# Patient Record
Sex: FEMALE | Race: OTHER | NOT HISPANIC OR LATINO | URBAN - METROPOLITAN AREA
[De-identification: names, ages, dates, MRNs, and addresses within clinical notes are randomized per-mention and may not be internally consistent; named-entity substitution may affect disease eponyms.]

---

## 2022-01-15 ENCOUNTER — EMERGENCY (EMERGENCY)
Facility: HOSPITAL | Age: 22
LOS: 0 days | Discharge: HOME | End: 2022-01-16
Attending: STUDENT IN AN ORGANIZED HEALTH CARE EDUCATION/TRAINING PROGRAM | Admitting: STUDENT IN AN ORGANIZED HEALTH CARE EDUCATION/TRAINING PROGRAM
Payer: MEDICAID

## 2022-01-15 VITALS
SYSTOLIC BLOOD PRESSURE: 105 MMHG | TEMPERATURE: 98 F | OXYGEN SATURATION: 98 % | RESPIRATION RATE: 18 BRPM | HEART RATE: 79 BPM | DIASTOLIC BLOOD PRESSURE: 68 MMHG

## 2022-01-15 DIAGNOSIS — R10.2 PELVIC AND PERINEAL PAIN: ICD-10-CM

## 2022-01-15 DIAGNOSIS — N80.1 ENDOMETRIOSIS OF OVARY: ICD-10-CM

## 2022-01-15 DIAGNOSIS — N39.0 URINARY TRACT INFECTION, SITE NOT SPECIFIED: ICD-10-CM

## 2022-01-15 LAB
ALBUMIN SERPL ELPH-MCNC: 4.9 G/DL — SIGNIFICANT CHANGE UP (ref 3.5–5.2)
ALP SERPL-CCNC: 81 U/L — SIGNIFICANT CHANGE UP (ref 30–115)
ALT FLD-CCNC: 16 U/L — SIGNIFICANT CHANGE UP (ref 0–41)
ANION GAP SERPL CALC-SCNC: 14 MMOL/L — SIGNIFICANT CHANGE UP (ref 7–14)
APPEARANCE UR: ABNORMAL
AST SERPL-CCNC: 15 U/L — SIGNIFICANT CHANGE UP (ref 0–41)
BACTERIA # UR AUTO: ABNORMAL
BASOPHILS # BLD AUTO: 0.04 K/UL — SIGNIFICANT CHANGE UP (ref 0–0.2)
BASOPHILS NFR BLD AUTO: 0.5 % — SIGNIFICANT CHANGE UP (ref 0–1)
BILIRUB SERPL-MCNC: 0.4 MG/DL — SIGNIFICANT CHANGE UP (ref 0.2–1.2)
BILIRUB UR-MCNC: NEGATIVE — SIGNIFICANT CHANGE UP
BUN SERPL-MCNC: 8 MG/DL — LOW (ref 10–20)
CALCIUM SERPL-MCNC: 9.5 MG/DL — SIGNIFICANT CHANGE UP (ref 8.5–10.1)
CHLORIDE SERPL-SCNC: 95 MMOL/L — LOW (ref 98–110)
CO2 SERPL-SCNC: 22 MMOL/L — SIGNIFICANT CHANGE UP (ref 17–32)
COLOR SPEC: YELLOW — SIGNIFICANT CHANGE UP
CREAT SERPL-MCNC: 0.6 MG/DL — LOW (ref 0.7–1.5)
DIFF PNL FLD: NEGATIVE — SIGNIFICANT CHANGE UP
EOSINOPHIL # BLD AUTO: 0.07 K/UL — SIGNIFICANT CHANGE UP (ref 0–0.7)
EOSINOPHIL NFR BLD AUTO: 0.9 % — SIGNIFICANT CHANGE UP (ref 0–8)
EPI CELLS # UR: 9 /HPF — HIGH (ref 0–5)
GLUCOSE SERPL-MCNC: 118 MG/DL — HIGH (ref 70–99)
GLUCOSE UR QL: NEGATIVE — SIGNIFICANT CHANGE UP
HCT VFR BLD CALC: 38.6 % — SIGNIFICANT CHANGE UP (ref 37–47)
HGB BLD-MCNC: 12.7 G/DL — SIGNIFICANT CHANGE UP (ref 12–16)
HYALINE CASTS # UR AUTO: 13 /LPF — HIGH (ref 0–7)
IMM GRANULOCYTES NFR BLD AUTO: 0.1 % — SIGNIFICANT CHANGE UP (ref 0.1–0.3)
KETONES UR-MCNC: NEGATIVE — SIGNIFICANT CHANGE UP
LEUKOCYTE ESTERASE UR-ACNC: ABNORMAL
LYMPHOCYTES # BLD AUTO: 1.66 K/UL — SIGNIFICANT CHANGE UP (ref 1.2–3.4)
LYMPHOCYTES # BLD AUTO: 21.5 % — SIGNIFICANT CHANGE UP (ref 20.5–51.1)
MCHC RBC-ENTMCNC: 28.5 PG — SIGNIFICANT CHANGE UP (ref 27–31)
MCHC RBC-ENTMCNC: 32.9 G/DL — SIGNIFICANT CHANGE UP (ref 32–37)
MCV RBC AUTO: 86.7 FL — SIGNIFICANT CHANGE UP (ref 81–99)
MONOCYTES # BLD AUTO: 0.5 K/UL — SIGNIFICANT CHANGE UP (ref 0.1–0.6)
MONOCYTES NFR BLD AUTO: 6.5 % — SIGNIFICANT CHANGE UP (ref 1.7–9.3)
NEUTROPHILS # BLD AUTO: 5.43 K/UL — SIGNIFICANT CHANGE UP (ref 1.4–6.5)
NEUTROPHILS NFR BLD AUTO: 70.5 % — SIGNIFICANT CHANGE UP (ref 42.2–75.2)
NITRITE UR-MCNC: NEGATIVE — SIGNIFICANT CHANGE UP
NRBC # BLD: 0 /100 WBCS — SIGNIFICANT CHANGE UP (ref 0–0)
PH UR: 6 — SIGNIFICANT CHANGE UP (ref 5–8)
PLATELET # BLD AUTO: 283 K/UL — SIGNIFICANT CHANGE UP (ref 130–400)
POTASSIUM SERPL-MCNC: 3.7 MMOL/L — SIGNIFICANT CHANGE UP (ref 3.5–5)
POTASSIUM SERPL-SCNC: 3.7 MMOL/L — SIGNIFICANT CHANGE UP (ref 3.5–5)
PROT SERPL-MCNC: 7.8 G/DL — SIGNIFICANT CHANGE UP (ref 6–8)
PROT UR-MCNC: SIGNIFICANT CHANGE UP
RBC # BLD: 4.45 M/UL — SIGNIFICANT CHANGE UP (ref 4.2–5.4)
RBC # FLD: 12 % — SIGNIFICANT CHANGE UP (ref 11.5–14.5)
RBC CASTS # UR COMP ASSIST: 15 /HPF — HIGH (ref 0–4)
SODIUM SERPL-SCNC: 131 MMOL/L — LOW (ref 135–146)
SP GR SPEC: 1.02 — SIGNIFICANT CHANGE UP (ref 1.01–1.03)
UROBILINOGEN FLD QL: SIGNIFICANT CHANGE UP
WBC # BLD: 7.71 K/UL — SIGNIFICANT CHANGE UP (ref 4.8–10.8)
WBC # FLD AUTO: 7.71 K/UL — SIGNIFICANT CHANGE UP (ref 4.8–10.8)
WBC UR QL: 167 /HPF — HIGH (ref 0–5)

## 2022-01-15 PROCEDURE — 99213 OFFICE O/P EST LOW 20 MIN: CPT

## 2022-01-15 PROCEDURE — 99285 EMERGENCY DEPT VISIT HI MDM: CPT

## 2022-01-15 RX ORDER — SODIUM CHLORIDE 9 MG/ML
1000 INJECTION INTRAMUSCULAR; INTRAVENOUS; SUBCUTANEOUS ONCE
Refills: 0 | Status: COMPLETED | OUTPATIENT
Start: 2022-01-15 | End: 2022-01-15

## 2022-01-15 RX ORDER — METRONIDAZOLE 500 MG
1 TABLET ORAL
Qty: 21 | Refills: 0
Start: 2022-01-15 | End: 2022-01-21

## 2022-01-15 RX ORDER — NITROFURANTOIN MACROCRYSTAL 50 MG
1 CAPSULE ORAL
Qty: 12 | Refills: 0
Start: 2022-01-15 | End: 2022-01-20

## 2022-01-15 RX ORDER — KETOROLAC TROMETHAMINE 30 MG/ML
15 SYRINGE (ML) INJECTION ONCE
Refills: 0 | Status: DISCONTINUED | OUTPATIENT
Start: 2022-01-15 | End: 2022-01-15

## 2022-01-15 RX ADMIN — SODIUM CHLORIDE 1000 MILLILITER(S): 9 INJECTION INTRAMUSCULAR; INTRAVENOUS; SUBCUTANEOUS at 21:02

## 2022-01-15 RX ADMIN — Medication 15 MILLIGRAM(S): at 21:02

## 2022-01-15 NOTE — ED PROVIDER NOTE - PATIENT PORTAL LINK FT
You can access the FollowMyHealth Patient Portal offered by [facility] by registering at the following website: [email or web address]. By joining [facility]’s FollowMyHealth portal, you will also be able to view your health information using other applications (apps) compatible with our system.

## 2022-01-15 NOTE — ED ADULT NURSE NOTE - OBJECTIVE STATEMENT
pt c/o pain from right ovarian cyst; intermittent since Sept  alert and ambulatory  axox3  denies significant pmh

## 2022-01-15 NOTE — ED PROVIDER NOTE - OBJECTIVE STATEMENT
21 Y.O. F, no pmh other than ovarian cyst dx in Sept 2021 here for worsened RLQ pain since January 5th. Started menstruation on Jan 5th, ended on Jan 11th. Pain localized to LLQ. Denies vaginal bleeding or discharge. Is sexually active w. one male partner. Denies hx of STIs but recalls being tx with abx starting with 'A' in september for "tx of the cyst". Denies contraception.

## 2022-01-15 NOTE — ED PROVIDER NOTE - NS ED ROS FT
Constitutional: (-) fever (-) chills (-) lightheadedness   Eyes/ENT: (-) blurry vision, (-) epistaxis (-) rhinorrhea (-) nasal congestion  Cardiovascular: (-) chest pain, (-) syncope (-) palpitations   Respiratory: (-) cough, (-) shortness of breath (-) pleurisy   Gastrointestinal: (-) vomiting, (-) diarrhea (+) abdominal pain (-) nausea (-) anorexia   (-) vaginal bleeding (+) vaginal discharge   Musculoskeletal: (-) neck pain, (-) back pain, (-) joint pain (-) joint swelling (-) painful ROM  Integumentary: (-) rash, (-) edema (-) lacerations (-) pruritis

## 2022-01-15 NOTE — ED PROVIDER NOTE - NSFOLLOWUPINSTRUCTIONS_ED_ALL_ED_FT
Please follow-up with your GYn provider for further evaluation and management of your endometrioma. You can take tylenol and/or motrin as needed for pain, as well as hotpacks. Please return to the ED if you develop worsening abdominal pain/bleeding/vomiting/fever/syncope.

## 2022-01-15 NOTE — ED PROVIDER NOTE - CARE PROVIDER_API CALL
BEV HOLLINS  Obstetrics  Phone: [phone]  Fax: [phone]  Established Patient  Follow Up Time: 4-6 Days

## 2022-01-15 NOTE — ED PROVIDER NOTE - CLINICAL SUMMARY MEDICAL DECISION MAKING FREE TEXT BOX
21-year-old female with past medical history of right ovarian cyst since September presents to the ER for right-sided pelvic pain associated with urinary frequency and urgency. Exam consistent with possible BV.  Labs and imaging reviewed.  Patient's right ovary measures 8.5 x 8.0 x 6.5 cm due to a 7.6 x 7.0 x 5.8 endometrioma.  There is flow to bilateral ovaries.  Patient also has a UTI which was treated with Rocephin. OB/GYN consulted, recs appreciated.

## 2022-01-15 NOTE — ED ADULT NURSE NOTE - ABDOMEN
Ventricular Rate : 86  Atrial Rate : 86  P-R Interval : 168  QRS Duration : 104  Q-T Interval : 394  QTC Calculation(Bazett) : 471  P Axis : 53  R Axis : 53  T Axis : -4  Diagnosis : Normal sinus rhythm  T wave abnormality, consider inferior ischemia  Prolonged QT interval or tu fusion, consider myocardial disease, electrolyte imbalance, or drug effects  Abnormal ECG  No previous ECGs available  Confirmed by ANDERSON MCBRIDE M.D. ([id number]) on 8/15/2019 7:10:58 AM  
soft

## 2022-01-15 NOTE — ED PROVIDER NOTE - PHYSICAL EXAMINATION
Physical Exam    Vital Signs: I have reviewed the initial vital signs.  Constitutional: well-nourished, appears stated age, no acute distress  Eyes: Conjunctiva pink, Sclera clear.  Cardiovascular: S1 and S2, regular rate  Respiratory: unlabored respiratory effort, speaking in full sentences, handling oral secretions  Gastrointestinal: soft, non-tender abdomen, no cvat b/l  GYN: no CMT, + thick white discharge consistent with BV. No adnexal tenderness.   Integumentary: warm, dry, no rashes, lacerations,  Neurologic: awake, alert

## 2022-01-15 NOTE — ED PROVIDER NOTE - ATTENDING CONTRIBUTION TO CARE
21-year-old female with past medical history of right ovarian cyst since September presents to the ER for right-sided pelvic pain associated with urinary frequency and urgency. Denies nausea, vomiting, fevers, chills, flank pain, abnormal vaginal bleeding or vaginal discharge.    Vitals within normal limits on arrival    Gen - NAD, Head - NCAT, Pharynx - clear, MMM, Heart - RRR, no m/g/r, Lungs - CTAB, no w/c/r, Abdomen - soft, NT, ND, : chaperone PA Patterson: thin grey discharge noted in the vaginal vault, no CMT or adnexal tenderness. Skin - No rash, Extremities - FROM, no edema, erythema, ecchymosis, brisk cap refill, Neuro - A&O x3, equal strength and sensation, non-focal exam    a/p:  pelvic pain + urinary sx  exam consistent with BV  labs, tvus, ua, reassess

## 2022-01-16 VITALS
DIASTOLIC BLOOD PRESSURE: 74 MMHG | SYSTOLIC BLOOD PRESSURE: 111 MMHG | HEART RATE: 80 BPM | OXYGEN SATURATION: 99 % | RESPIRATION RATE: 18 BRPM

## 2022-01-16 PROCEDURE — 76830 TRANSVAGINAL US NON-OB: CPT | Mod: 26

## 2022-01-16 RX ADMIN — Medication 15 MILLIGRAM(S): at 01:04

## 2022-01-16 NOTE — CONSULT NOTE ADULT - ATTENDING COMMENTS
20 y/o G0 with enlarged right ovary with endometrioma, not currently torsed. Patient desires conservative management at this time. Torsion precautions discussed. Will follow up outpatient.

## 2022-01-16 NOTE — CONSULT NOTE ADULT - SUBJECTIVE AND OBJECTIVE BOX
PGY 1    Chief Complaint: RLQ pain    HPI:   22 yo G0 with LMP  presents for RLQ pain that has been worsening. She states that this pain has been going on since 2021 for which she has been following with Dr. Rodriguez on [facility]. She states that the pain usually starts in the middle of her cycles and continues until a week after she stops bleeding. She currently denies vaginal discharge, vaginal bleeding, or vaginal itching. She endorses feeling febrile and lightheaded since last night. Denies     Ob/Gyn History:  G P                 LMP -                   Cycle Length -   Denies history of ovarian cysts, uterine fibroids, abnormal paps, or STIs  Last Pap Smear -   Last Mammogram -   Last Colonoscopy -      Patient seen and examined bedside. Pain well controlled at this time. No complaints at this time. No overnight events. Denies fevers, chills, vomiting, chest pain, shortness of berath, severe abdominal pain, heavy vaginal bleeding.    Denies the following: constitutional symptoms, visual symptoms, cardiovascular symptoms, respiratory symptoms, GI symptoms, musculoskeletal symptoms, skin symptoms, neurologic symptoms, hematologic symptoms, allergic symptoms, psychiatric symptoms  Except any pertinent positives listed.     Home Medications:      Allergies    No Known Allergies    Intolerances        PAST MEDICAL & SURGICAL HISTORY:      FAMILY HISTORY:      SOCIAL HISTORY: Denies cigarette use, alcohol use, or illicit drug use    Vital Signs Last 24 Hrs  T(F): 98.1 (2022 00:26), Max: 98.3 (15 Shorty 2022 19:22)  HR: 80 (2022 02:27) (78 - 80)  BP: 111/74 (2022 02:27) (91/59 - 111/74)  RR: 18 (2022 02:27) (18 - 18)      General Appearance - AAOx3, NAD  Heart - S1S2 regular rate and rhythm  Lung - CTA Bilaterally  Abdomen - Soft, nontender, nondistended, no rebound, no rigidity, no guarding, bowel sounds present    GYN/Pelvis:    Labia Majora - Normal  Labia Minora - Normal  Clitoris - Normal  Urethra - Normal  Vagina - Normal  Cervix - Normal    Uterus:  Size - Normal  Tenderness - None  Mass - None  Freely mobile    Adnexa:  Masses - None  Tenderness - None      Meds:   (ADM OVERRIDE) 1 each &lt;see task&gt; GiveOnce  ketorolac   Injectable 15 milliGRAM(s) IV Push once  sodium chloride 0.9% Bolus 1000 milliLiter(s) IV Bolus once        LABS:                        12.7   7.71  )-----------( 283      ( 15 Shorty 2022 20:43 )             38.6         -15    131<L>  |  95<L>  |  8<L>  ----------------------------<  118<H>  3.7   |  22  |  0.6<L>    Ca    9.5      15 Shorty 2022 20:43    TPro  7.8  /  Alb  4.9  /  TBili  0.4  /  DBili  x   /  AST  15  /  ALT  16  /  AlkPhos  81  01-15      Urinalysis Basic - ( 15 Shorty 2022 21:07 )    Color: Yellow / Appearance: Slightly Turbid / S.021 / pH: x  Gluc: x / Ketone: Negative  / Bili: Negative / Urobili: <2 mg/dL   Blood: x / Protein: Trace / Nitrite: Negative   Leuk Esterase: Large / RBC: 15 /HPF /  /HPF   Sq Epi: x / Non Sq Epi: 9 /HPF / Bacteria: Few          RADIOLOGY & ADDITIONAL STUDIES:   PGY 1    Chief Complaint: RLQ pain    HPI:   22 yo G0 with LMP  presents for RLQ pain that has been worsening. She states that this pain has been going on since 2021 for which she has been following with LETICIA Pichardo at [facility]. She states that the pain usually starts in the middle of her cycles and continues until a week after she stops bleeding. She currently denies vaginal discharge, vaginal bleeding, or vaginal itching. Patient is currently sexually active with one male partner. They use condoms. She endorses feeling febrile and lightheaded since last night. Denies fevers, chills, SOB, CP, nausea, vomiting, dysuria, urinary frequency, urinary urgency or LE swelling.    Ob/Gyn History:  G0                 LMP -                    Cycle Length - 28 days    history of ovarian cysts, denies uterine fibroids, abnormal paps, or STIs  Last Pap Smear -  NILM per patient    Home Medications: none    Allergies  No Known Allergies    PAST MEDICAL & SURGICAL HISTORY:  none    FAMILY HISTORY:  none    SOCIAL HISTORY: Denies cigarette use, alcohol use, or illicit drug use    Vital Signs Last 24 Hrs  T(F): 98.1 (2022 00:26), Max: 98.3 (15 Shorty 2022 19:22)  HR: 80 (2022 02:27) (78 - 80)  BP: 111/74 (2022 02:27) (91/59 - 111/74)  RR: 18 (2022 02:27) (18 - 18)    General Appearance - AAOx3, NAD  Heart - S1S2 regular rate and rhythm  Lung - CTA Bilaterally  Abdomen - Soft, nontender, nondistended, no rebound, no rigidity, no guarding, bowel sounds present    GYN/Pelvis:    Labia Majora - Normal  Labia Minora - Normal  Clitoris - Normal  Urethra - Normal  Vagina - Normal, no blood, physiological discharge noted  Cervix - Normal, closed    Uterus:  Size - Normal, 6 week sized uterus  Tenderness - None  Mass - None  Freely mobile    Adnexa:  Masses - None  Tenderness - None      Meds:   ketorolac   Injectable 15 milliGRAM(s) IV Push once  sodium chloride 0.9% Bolus 1000 milliLiter(s) IV Bolus once        LABS:                        12.7   7.71  )-----------( 283      ( 15 Shorty 2022 20:43 )             38.6         01-15    131<L>  |  95<L>  |  8<L>  ----------------------------<  118<H>  3.7   |  22  |  0.6<L>    Ca    9.5      15 Shorty 2022 20:43    TPro  7.8  /  Alb  4.9  /  TBili  0.4  /  DBili  x   /  AST  15  /  ALT  16  /  AlkPhos  81  01-15      Urinalysis Basic - ( 15 Shorty 2022 21:07 )    Color: Yellow / Appearance: Slightly Turbid / S.021 / pH: x  Gluc: x / Ketone: Negative  / Bili: Negative / Urobili: <2 mg/dL   Blood: x / Protein: Trace / Nitrite: Negative   Leuk Esterase: Large / RBC: 15 /HPF /  /HPF   Sq Epi: x / Non Sq Epi: 9 /HPF / Bacteria: Few    RADIOLOGY & ADDITIONAL STUDIES:  < from: US Transvaginal (22 @ 01:22) >    PROCEDURE DATE:  2022          INTERPRETATION:  CLINICAL HISTORY: Pelvic pain.    COMPARISON: None.    PROCEDURE: Transabdominal and transvaginal ultrasound of the pelvis was   performed, including Doppler.    LMP: 2022.    FINDINGS:    UTERUS: Measures 7.1 x 4.0 x 4.9 cm, with normal echogenicity and   morphology. The endometrial echo complex measures 0.8 cm, which is normal   in thickness.    RIGHT OVARY: Measures 8.5 x 6.5 x 8.0 cm (229.8 cc), and contains a 7.6 x   5.8 x 7.0 cm predominantly homogeneously echogenic structure with slight   increased echogenicity at its periphery with surrounding but no   identifiable internal flow, suspect endometrioma. Doppler flow is   demonstrated to the right ovary.    LEFT OVARY: Measures 3.9 x 1.8 x 2.8 cm (10.3 cc), and is unremarkable.   Doppler flow is demonstrated to the left ovary.    OTHER: No free fluid in the pelvis.      IMPRESSION:    Enlarged right ovary with a 7.6 cm suspected endometrioma.    --- End of Report ---        < end of copied text >   PGY 1    Chief Complaint: RLQ pain    HPI:   20 yo G0 with LMP  presents for RLQ pain that has been worsening. She states that this pain has been going on since 2021 for which she has been following with LETICIA Pichardo at [facility]. She states that the pain usually starts in the middle of her cycles and continues until a week after she stops bleeding. Pain is currently 1/10. She currently denies vaginal discharge, vaginal bleeding, or vaginal itching. Patient is currently sexually active with one male partner. They use condoms. She endorses feeling febrile and lightheaded since last night. Denies fevers, chills, SOB, CP, nausea, vomiting, dysuria, urinary frequency, urinary urgency or LE swelling.    Ob/Gyn History:  G0                 LMP -                    Cycle Length - 28 days    history of ovarian cysts, denies uterine fibroids, abnormal paps, or STIs  Last Pap Smear -  NILM per patient    Home Medications: none    Allergies  No Known Allergies    PAST MEDICAL & SURGICAL HISTORY:  none    FAMILY HISTORY:  none    SOCIAL HISTORY: Denies cigarette use, alcohol use, or illicit drug use    Vital Signs Last 24 Hrs  T(F): 98.1 (2022 00:26), Max: 98.3 (15 Shorty 2022 19:22)  HR: 80 (2022 02:27) (78 - 80)  BP: 111/74 (2022 02:27) (91/59 - 111/74)  RR: 18 (2022 02:27) (18 - 18)    General Appearance - AAOx3, NAD  Heart - S1S2 regular rate and rhythm  Lung - CTA Bilaterally  Abdomen - Soft, nontender, nondistended, no rebound, no rigidity, no guarding, bowel sounds present    GYN/Pelvis:    Labia Majora - Normal  Labia Minora - Normal  Clitoris - Normal  Urethra - Normal  Vagina - Normal, no blood, physiological discharge noted  Cervix - Normal, closed    Uterus:  Size - Normal, 6 week sized uterus  Tenderness - None  Mass - None  Freely mobile    Adnexa:  Masses - None  Tenderness - None      Meds:   ketorolac   Injectable 15 milliGRAM(s) IV Push once  sodium chloride 0.9% Bolus 1000 milliLiter(s) IV Bolus once        LABS:                        12.7   7.71  )-----------( 283      ( 15 Shorty 2022 20:43 )             38.6         01-15    131<L>  |  95<L>  |  8<L>  ----------------------------<  118<H>  3.7   |  22  |  0.6<L>    Ca    9.5      15 Shorty 2022 20:43    TPro  7.8  /  Alb  4.9  /  TBili  0.4  /  DBili  x   /  AST  15  /  ALT  16  /  AlkPhos  81  01-15      Urinalysis Basic - ( 15 Shorty 2022 21:07 )    Color: Yellow / Appearance: Slightly Turbid / S.021 / pH: x  Gluc: x / Ketone: Negative  / Bili: Negative / Urobili: <2 mg/dL   Blood: x / Protein: Trace / Nitrite: Negative   Leuk Esterase: Large / RBC: 15 /HPF /  /HPF   Sq Epi: x / Non Sq Epi: 9 /HPF / Bacteria: Few    RADIOLOGY & ADDITIONAL STUDIES:  < from: US Transvaginal (22 @ 01:22) >    PROCEDURE DATE:  2022          INTERPRETATION:  CLINICAL HISTORY: Pelvic pain.    COMPARISON: None.    PROCEDURE: Transabdominal and transvaginal ultrasound of the pelvis was   performed, including Doppler.    LMP: 2022.    FINDINGS:    UTERUS: Measures 7.1 x 4.0 x 4.9 cm, with normal echogenicity and   morphology. The endometrial echo complex measures 0.8 cm, which is normal   in thickness.    RIGHT OVARY: Measures 8.5 x 6.5 x 8.0 cm (229.8 cc), and contains a 7.6 x   5.8 x 7.0 cm predominantly homogeneously echogenic structure with slight   increased echogenicity at its periphery with surrounding but no   identifiable internal flow, suspect endometrioma. Doppler flow is   demonstrated to the right ovary.    LEFT OVARY: Measures 3.9 x 1.8 x 2.8 cm (10.3 cc), and is unremarkable.   Doppler flow is demonstrated to the left ovary.    OTHER: No free fluid in the pelvis.      IMPRESSION:    Enlarged right ovary with a 7.6 cm suspected endometrioma.    --- End of Report ---        < end of copied text >   PGY 1 Note    Chief Complaint: RLQ pain    HPI: 20 yo G0 with LMP /, presenting to the ED for RLQ pain. She states that this pain began 2021, worsening for the last 10 days. Reports h/o ovarian cysts for which she has been following with LETICIA Pichardo at [facility]. She states that the pain usually starts in the middle of her cycles and continues until a week after she stops bleeding. Pain is currently 1/10, last received Toradol 15mg IVP @2100. Patient is currently sexually active with one male partner, use condoms for contraception. Reported feeling febrile at home, did not take temp at that time. Currently denies fevers, chills, SOB, CP, nausea, vomiting, dysuria, urinary frequency, VB, urinary urgency or LE swelling.    Ob/Gyn History:  G0                 LMP -                    Cycle Length - 28 days    history of ovarian cysts, denies uterine fibroids, abnormal paps, or STIs  Last Pap Smear -  NILM per patient    Home Medications: none    Allergies  No Known Allergies    PAST MEDICAL & SURGICAL HISTORY:  none    FAMILY HISTORY:  none    SOCIAL HISTORY: Denies cigarette use, alcohol use, or illicit drug use    Vital Signs Last 24 Hrs  T(F): 98.1 (2022 00:26), Max: 98.3 (15 Shorty 2022 19:22)  HR: 80 (2022 02:27) (78 - 80)  BP: 111/74 (2022 02:27) (91/59 - 111/74)  RR: 18 (2022 02:27) (18 - 18)    General Appearance - AAOx3, NAD  Heart - S1S2 regular rate and rhythm  Lung - CTA Bilaterally  Abdomen - Soft, nontender, nondistended, no rebound, no rigidity, no guarding, bowel sounds present    GYN/Pelvis:    Labia Majora - Normal  Labia Minora - Normal  Clitoris - Normal  Urethra - Normal  Vagina - Normal, no bleeding, no lacerations, physiological discharge noted  Cervix - Normal, closed    Uterus:  Size - Normal, 6 week sized uterus  Tenderness - None  Mass - None  Freely mobile    Adnexa:  Masses - None  Tenderness - None      Meds:   ketorolac   Injectable 15 milliGRAM(s) IV Push once  sodium chloride 0.9% Bolus 1000 milliLiter(s) IV Bolus once        LABS:                        12.7   7.71  )-----------( 283      ( 15 Shorty 2022 20:43 )             38.6         01-15    131<L>  |  95<L>  |  8<L>  ----------------------------<  118<H>  3.7   |  22  |  0.6<L>    Ca    9.5      15 Shorty 2022 20:43    TPro  7.8  /  Alb  4.9  /  TBili  0.4  /  DBili  x   /  AST  15  /  ALT  16  /  AlkPhos  81  01-15      Urinalysis Basic - ( 15 Shorty 2022 21:07 )    Color: Yellow / Appearance: Slightly Turbid / S.021 / pH: x  Gluc: x / Ketone: Negative  / Bili: Negative / Urobili: <2 mg/dL   Blood: x / Protein: Trace / Nitrite: Negative   Leuk Esterase: Large / RBC: 15 /HPF /  /HPF   Sq Epi: x / Non Sq Epi: 9 /HPF / Bacteria: Few    RADIOLOGY & ADDITIONAL STUDIES:  < from: US Transvaginal (22 @ 01:22) >    PROCEDURE DATE:  2022          INTERPRETATION:  CLINICAL HISTORY: Pelvic pain.    COMPARISON: None.    PROCEDURE: Transabdominal and transvaginal ultrasound of the pelvis was   performed, including Doppler.    LMP: 2022.    FINDINGS:    UTERUS: Measures 7.1 x 4.0 x 4.9 cm, with normal echogenicity and   morphology. The endometrial echo complex measures 0.8 cm, which is normal   in thickness.    RIGHT OVARY: Measures 8.5 x 6.5 x 8.0 cm (229.8 cc), and contains a 7.6 x   5.8 x 7.0 cm predominantly homogeneously echogenic structure with slight   increased echogenicity at its periphery with surrounding but no   identifiable internal flow, suspect endometrioma. Doppler flow is   demonstrated to the right ovary.    LEFT OVARY: Measures 3.9 x 1.8 x 2.8 cm (10.3 cc), and is unremarkable.   Doppler flow is demonstrated to the left ovary.    OTHER: No free fluid in the pelvis.      IMPRESSION:    Enlarged right ovary with a 7.6 cm suspected endometrioma.    --- End of Report ---        < end of copied text >

## 2022-01-16 NOTE — CONSULT NOTE ADULT - ASSESSMENT
22 yo G0 with LMP Jan 5 with RLQ pain, that has now resolved, found to have a 7.6cm suspected endometrioma, currently clinically and hemodynamically stable.    - no acute gyn intervention  - discussed with patients the finding of 7.6cm suspected endometrioma. Explained despite ultrasound findings being reassuring, that the only way to 100% know if the ovary is torsed is to perform exploratory surgery. Patient opted for conservative management and will follow up with her GYN outpatient for further workup of her ovarian cyst.  - heat packs/tylenol/motrin for pain  - dispo per ED    Dr. Gan and Dr. Pelayo aware. 22yo G0 with LMP 1/5, with RLQ pain, now resolved, with 7.6cm suspected endometrioma, low suspicion for ovarian torsion, currently clinically and hemodynamically stable.  - no acute GYN intervention  - discussed with patients the finding of 7.6cm suspected endometrioma. Explained despite ultrasound findings being reassuring, that the only way to 100% know if the ovary is torsed is to perform exploratory surgery. Patient opted for conservative management and will follow up with her GYN outpatient for further workup of her ovarian cyst.  - recommend heat packs/tylenol/motrin for pain  - torsion precautions  - follow up with OBGYN in 1-2 weeks   - dispo per ED    Dr. Gan and Dr. Pelayo aware.

## 2022-01-17 LAB
CULTURE RESULTS: SIGNIFICANT CHANGE UP
SPECIMEN SOURCE: SIGNIFICANT CHANGE UP

## 2022-08-02 ENCOUNTER — APPOINTMENT (OUTPATIENT)
Dept: OBGYN | Facility: CLINIC | Age: 22
End: 2022-08-02

## 2022-08-02 VITALS
SYSTOLIC BLOOD PRESSURE: 109 MMHG | DIASTOLIC BLOOD PRESSURE: 77 MMHG | HEART RATE: 102 BPM | TEMPERATURE: 99 F | BODY MASS INDEX: 17.66 KG/M2 | WEIGHT: 106 LBS | HEIGHT: 65 IN

## 2022-08-02 PROBLEM — Z00.00 ENCOUNTER FOR PREVENTIVE HEALTH EXAMINATION: Status: ACTIVE | Noted: 2022-08-02

## 2022-08-02 PROCEDURE — 99214 OFFICE O/P EST MOD 30 MIN: CPT

## 2022-08-02 NOTE — PROCEDURE
[Suspected Ovarian Cyst] : suspected ovarian cyst [Transvaginal Ultrasound] : transvaginal ultrasound [FreeTextEntry3] : no free fluid\par cervix normal [FreeTextEntry5] : 62cc volume,   endometrium 7.9mm [FreeTextEntry7] : 209cc, 9cm endometrioma [FreeTextEntry8] : 7.9cc , cystic

## 2022-08-02 NOTE — PHYSICAL EXAM
[Labia Majora] : normal [Labia Minora] : normal [Normal] : normal [Tenderness] : tender [Uterine Adnexae] : normal [Adnexa Tenderness] : tender [___] : [unfilled] ~Ucm mass on the right [Mass ___ cm] : no uterine mass was palpated [FreeTextEntry6] : uterus fixed anteriorly...pushed up by the large right endometrioma

## 2022-08-02 NOTE — HISTORY OF PRESENT ILLNESS
[Y] : Patient is sexually active [TextBox_102] : clots...menarche age 12 [MensesFreq] : 28 [MensesLength] : 5 [MensesAmount] : heavy [PGHxTotal] : 0 [FreeTextEntry1] : condoms all the time

## 2022-09-07 ENCOUNTER — APPOINTMENT (OUTPATIENT)
Dept: OBGYN | Facility: CLINIC | Age: 22
End: 2022-09-07

## 2022-09-07 ENCOUNTER — NON-APPOINTMENT (OUTPATIENT)
Age: 22
End: 2022-09-07

## 2022-09-07 VITALS
WEIGHT: 16 LBS | HEART RATE: 82 BPM | BODY MASS INDEX: 2.67 KG/M2 | SYSTOLIC BLOOD PRESSURE: 98 MMHG | HEIGHT: 65 IN | DIASTOLIC BLOOD PRESSURE: 68 MMHG

## 2022-09-07 DIAGNOSIS — R10.2 PELVIC AND PERINEAL PAIN: ICD-10-CM

## 2022-09-07 PROCEDURE — 99215 OFFICE O/P EST HI 40 MIN: CPT

## 2022-09-07 NOTE — HISTORY OF PRESENT ILLNESS
[FreeTextEntry1] : Extensive discussion regarding her MRI, which was discussed line by line with explanations along with review of her previous operative report and pathology from her previous surgery in March of 2022.

## 2022-09-30 ENCOUNTER — NON-APPOINTMENT (OUTPATIENT)
Age: 22
End: 2022-09-30

## 2022-10-06 ENCOUNTER — OUTPATIENT (OUTPATIENT)
Dept: OUTPATIENT SERVICES | Facility: HOSPITAL | Age: 22
LOS: 1 days | Discharge: HOME | End: 2022-10-06

## 2022-10-06 ENCOUNTER — APPOINTMENT (OUTPATIENT)
Dept: OBGYN | Facility: CLINIC | Age: 22
End: 2022-10-06

## 2022-10-06 VITALS
HEART RATE: 77 BPM | DIASTOLIC BLOOD PRESSURE: 78 MMHG | TEMPERATURE: 97 F | SYSTOLIC BLOOD PRESSURE: 112 MMHG | OXYGEN SATURATION: 100 % | HEIGHT: 64 IN | WEIGHT: 111.99 LBS | RESPIRATION RATE: 17 BRPM

## 2022-10-06 VITALS
BODY MASS INDEX: 18.16 KG/M2 | SYSTOLIC BLOOD PRESSURE: 118 MMHG | HEART RATE: 89 BPM | WEIGHT: 109 LBS | DIASTOLIC BLOOD PRESSURE: 79 MMHG | HEIGHT: 65 IN

## 2022-10-06 DIAGNOSIS — Z01.818 ENCOUNTER FOR OTHER PREPROCEDURAL EXAMINATION: ICD-10-CM

## 2022-10-06 DIAGNOSIS — Z98.890 OTHER SPECIFIED POSTPROCEDURAL STATES: Chronic | ICD-10-CM

## 2022-10-06 DIAGNOSIS — N80.9 ENDOMETRIOSIS, UNSPECIFIED: ICD-10-CM

## 2022-10-06 DIAGNOSIS — N83.6 HEMATOSALPINX: ICD-10-CM

## 2022-10-06 LAB
ALBUMIN SERPL ELPH-MCNC: 5.1 G/DL — SIGNIFICANT CHANGE UP (ref 3.5–5.2)
ALP SERPL-CCNC: 79 U/L — SIGNIFICANT CHANGE UP (ref 30–115)
ALT FLD-CCNC: 25 U/L — SIGNIFICANT CHANGE UP (ref 0–41)
ANION GAP SERPL CALC-SCNC: 11 MMOL/L — SIGNIFICANT CHANGE UP (ref 7–14)
AST SERPL-CCNC: 18 U/L — SIGNIFICANT CHANGE UP (ref 0–41)
BASOPHILS # BLD AUTO: 0.03 K/UL — SIGNIFICANT CHANGE UP (ref 0–0.2)
BASOPHILS NFR BLD AUTO: 0.5 % — SIGNIFICANT CHANGE UP (ref 0–1)
BILIRUB SERPL-MCNC: 0.5 MG/DL — SIGNIFICANT CHANGE UP (ref 0.2–1.2)
BUN SERPL-MCNC: 13 MG/DL — SIGNIFICANT CHANGE UP (ref 10–20)
CALCIUM SERPL-MCNC: 9.7 MG/DL — SIGNIFICANT CHANGE UP (ref 8.4–10.5)
CHLORIDE SERPL-SCNC: 99 MMOL/L — SIGNIFICANT CHANGE UP (ref 98–110)
CO2 SERPL-SCNC: 25 MMOL/L — SIGNIFICANT CHANGE UP (ref 17–32)
CREAT SERPL-MCNC: 0.5 MG/DL — LOW (ref 0.7–1.5)
EGFR: 136 ML/MIN/1.73M2 — SIGNIFICANT CHANGE UP
EOSINOPHIL # BLD AUTO: 0.02 K/UL — SIGNIFICANT CHANGE UP (ref 0–0.7)
EOSINOPHIL NFR BLD AUTO: 0.3 % — SIGNIFICANT CHANGE UP (ref 0–8)
GLUCOSE SERPL-MCNC: 81 MG/DL — SIGNIFICANT CHANGE UP (ref 70–99)
HCT VFR BLD CALC: 36.3 % — LOW (ref 37–47)
HGB BLD-MCNC: 12.1 G/DL — SIGNIFICANT CHANGE UP (ref 12–16)
IMM GRANULOCYTES NFR BLD AUTO: 0.3 % — SIGNIFICANT CHANGE UP (ref 0.1–0.3)
LYMPHOCYTES # BLD AUTO: 1.31 K/UL — SIGNIFICANT CHANGE UP (ref 1.2–3.4)
LYMPHOCYTES # BLD AUTO: 20.7 % — SIGNIFICANT CHANGE UP (ref 20.5–51.1)
MCHC RBC-ENTMCNC: 28.5 PG — SIGNIFICANT CHANGE UP (ref 27–31)
MCHC RBC-ENTMCNC: 33.3 G/DL — SIGNIFICANT CHANGE UP (ref 32–37)
MCV RBC AUTO: 85.4 FL — SIGNIFICANT CHANGE UP (ref 81–99)
MONOCYTES # BLD AUTO: 0.44 K/UL — SIGNIFICANT CHANGE UP (ref 0.1–0.6)
MONOCYTES NFR BLD AUTO: 7 % — SIGNIFICANT CHANGE UP (ref 1.7–9.3)
NEUTROPHILS # BLD AUTO: 4.5 K/UL — SIGNIFICANT CHANGE UP (ref 1.4–6.5)
NEUTROPHILS NFR BLD AUTO: 71.2 % — SIGNIFICANT CHANGE UP (ref 42.2–75.2)
NRBC # BLD: 0 /100 WBCS — SIGNIFICANT CHANGE UP (ref 0–0)
PLATELET # BLD AUTO: 176 K/UL — SIGNIFICANT CHANGE UP (ref 130–400)
POTASSIUM SERPL-MCNC: 4.2 MMOL/L — SIGNIFICANT CHANGE UP (ref 3.5–5)
POTASSIUM SERPL-SCNC: 4.2 MMOL/L — SIGNIFICANT CHANGE UP (ref 3.5–5)
PROT SERPL-MCNC: 7.7 G/DL — SIGNIFICANT CHANGE UP (ref 6–8)
RBC # BLD: 4.25 M/UL — SIGNIFICANT CHANGE UP (ref 4.2–5.4)
RBC # FLD: 13.1 % — SIGNIFICANT CHANGE UP (ref 11.5–14.5)
SODIUM SERPL-SCNC: 135 MMOL/L — SIGNIFICANT CHANGE UP (ref 135–146)
WBC # BLD: 6.32 K/UL — SIGNIFICANT CHANGE UP (ref 4.8–10.8)
WBC # FLD AUTO: 6.32 K/UL — SIGNIFICANT CHANGE UP (ref 4.8–10.8)

## 2022-10-06 PROCEDURE — 99215 OFFICE O/P EST HI 40 MIN: CPT

## 2022-10-06 NOTE — H&P PST ADULT - TOBACCO USE
Never smoker
Implemented All Universal Safety Interventions:  [place] to call system. Call bell, personal items and telephone within reach. Instruct patient to call for assistance. Room bathroom lighting operational. Non-slip footwear when patient is off stretcher. Physically safe environment: no spills, clutter or unnecessary equipment. Stretcher in lowest position, wheels locked, appropriate side rails in place.

## 2022-10-06 NOTE — HISTORY OF PRESENT ILLNESS
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  She also understands that she will likely need BRITTNEE assistance with assisted reproductive technologies to be able to conceive.\par Also discussed the possibility of interrupting her hymenal ring with placement of a manipulator, but that I would do the best that I can to minimize interruption of the area.  I told her ythat the manipulator would make the procedure safer to excise the endometriosis and I gave her the option to forego the manipulator, but she wants me to do what is safest, and that her fiance would not be upset.  	 asked about her mother's opinion and she stated that she is 21 yo and will make her own decisions.\par Informed consent as follows due to her severe endometriosis:\par Discussed in detail the procedure and the potential complications including but not limited to hemorrhage, infection, and fever.  The risks of potential urinary, gastroenterological and vascular direct complications discussed as well, including but limited to, ureteral repair, bladder repair, bowel repair, vascular repair were discussed as well.  She understands and is still willing to undergo the procedure.  All questioned answered to her satisfaction.\par \par

## 2022-10-06 NOTE — H&P PST ADULT - REASON FOR ADMISSION
23 Y/O FEMALE HERE FOR PRE-ADMISSION SURGICAL TESTING. PATIENT REPORTS SHE WAS DIAGNOSED WITH B/L OVARIAN CYSTS LAST MAY. SHE HAD A BILATERAL OVARIAN CYSTECTOMY 3/2022. SHE STATES HER PELVIC PAIN NEVER WENT AWAY AFTER THE SURGERY. SHE WENT FOR AN MRI OF THE ABDOMEN AND PELVIS 8/31/22, WHICH REVEALED + OVARIAN CYSTS HAVE RETURNED TO BILATERAL OVARIES. SHE ALSO STATES SHE MAY HAVE ENDOMETRIOSIS.  NOW FOR SCHEDULED PELVIC EXAM UNDER GA, ROBOTIC EXCISION OF ENDOMETRIOSIS, POSSIBLE SALPINGECTOMY, POSSIBLE OVARIAN CYSTECTOMY.

## 2022-10-06 NOTE — H&P PST ADULT - HISTORY OF PRESENT ILLNESS
PATIENT DENIES CHEST PAIN, SHORTNESS OF BREATH, PALPITATIONS, COUGHING, FEVER, DYSURIA.  CAN WALK UP 4-5 FLIGHTS OF STEPS WITHOUT SOB.    NO COUGH, FEVER, SORE THROAT, HEADACHE, LOSS OF TASTE OR SMELL. NO KNOWN EXPOSURE TO ANYONE WITH COVID. PATIENT WAS INSTRUCTED TO ISOLATE FROM NOW UNTIL THE SURGERY.  HAD THE J&J VACCINE 2/2021    Anesthesia Alert  NO--Difficult Airway  NO--History of neck surgery or radiation  NO--Limited ROM of neck  NO--History of Malignant hyperthermia  NO--Personal or family history of Pseudocholinesterase deficiency  NO--Prior Anesthesia Complication  NO--Latex Allergy  NO--Loose teeth  NO--History of Rheumatoid Arthritis  NO--QUIN  NO--BLEEDING RISK

## 2022-10-10 ENCOUNTER — EMERGENCY (EMERGENCY)
Facility: HOSPITAL | Age: 22
LOS: 0 days | Discharge: LEFT BEFORE DISCHARGE | End: 2022-10-10
Admitting: EMERGENCY MEDICINE

## 2022-10-10 ENCOUNTER — INPATIENT (INPATIENT)
Facility: HOSPITAL | Age: 22
LOS: 1 days | Discharge: HOME | End: 2022-10-12
Attending: OBSTETRICS & GYNECOLOGY | Admitting: OBSTETRICS & GYNECOLOGY

## 2022-10-10 VITALS
OXYGEN SATURATION: 100 % | TEMPERATURE: 98 F | DIASTOLIC BLOOD PRESSURE: 56 MMHG | WEIGHT: 106.92 LBS | HEIGHT: 64 IN | RESPIRATION RATE: 18 BRPM | SYSTOLIC BLOOD PRESSURE: 111 MMHG | HEART RATE: 91 BPM

## 2022-10-10 DIAGNOSIS — N83.201 UNSPECIFIED OVARIAN CYST, RIGHT SIDE: ICD-10-CM

## 2022-10-10 DIAGNOSIS — Z98.890 OTHER SPECIFIED POSTPROCEDURAL STATES: Chronic | ICD-10-CM

## 2022-10-10 DIAGNOSIS — Z53.21 PROCEDURE AND TREATMENT NOT CARRIED OUT DUE TO PATIENT LEAVING PRIOR TO BEING SEEN BY HEALTH CARE PROVIDER: ICD-10-CM

## 2022-10-10 DIAGNOSIS — N83.202 UNSPECIFIED OVARIAN CYST, LEFT SIDE: ICD-10-CM

## 2022-10-10 DIAGNOSIS — Z02.9 ENCOUNTER FOR ADMINISTRATIVE EXAMINATIONS, UNSPECIFIED: ICD-10-CM

## 2022-10-10 LAB
ALBUMIN SERPL ELPH-MCNC: 4.9 G/DL — SIGNIFICANT CHANGE UP (ref 3.5–5.2)
ALP SERPL-CCNC: 72 U/L — SIGNIFICANT CHANGE UP (ref 30–115)
ALT FLD-CCNC: 19 U/L — SIGNIFICANT CHANGE UP (ref 0–41)
ANION GAP SERPL CALC-SCNC: 12 MMOL/L — SIGNIFICANT CHANGE UP (ref 7–14)
APPEARANCE UR: CLEAR — SIGNIFICANT CHANGE UP
AST SERPL-CCNC: 19 U/L — SIGNIFICANT CHANGE UP (ref 0–41)
BASOPHILS # BLD AUTO: 0.03 K/UL — SIGNIFICANT CHANGE UP (ref 0–0.2)
BASOPHILS NFR BLD AUTO: 0.3 % — SIGNIFICANT CHANGE UP (ref 0–1)
BILIRUB SERPL-MCNC: 0.3 MG/DL — SIGNIFICANT CHANGE UP (ref 0.2–1.2)
BILIRUB UR-MCNC: NEGATIVE — SIGNIFICANT CHANGE UP
BUN SERPL-MCNC: 11 MG/DL — SIGNIFICANT CHANGE UP (ref 10–20)
CALCIUM SERPL-MCNC: 9.6 MG/DL — SIGNIFICANT CHANGE UP (ref 8.4–10.5)
CHLORIDE SERPL-SCNC: 101 MMOL/L — SIGNIFICANT CHANGE UP (ref 98–110)
CO2 SERPL-SCNC: 24 MMOL/L — SIGNIFICANT CHANGE UP (ref 17–32)
COLOR SPEC: YELLOW — SIGNIFICANT CHANGE UP
CREAT SERPL-MCNC: 0.6 MG/DL — LOW (ref 0.7–1.5)
DIFF PNL FLD: NEGATIVE — SIGNIFICANT CHANGE UP
EGFR: 130 ML/MIN/1.73M2 — SIGNIFICANT CHANGE UP
EOSINOPHIL # BLD AUTO: 0.05 K/UL — SIGNIFICANT CHANGE UP (ref 0–0.7)
EOSINOPHIL NFR BLD AUTO: 0.5 % — SIGNIFICANT CHANGE UP (ref 0–8)
GLUCOSE SERPL-MCNC: 143 MG/DL — HIGH (ref 70–99)
GLUCOSE UR QL: NEGATIVE MG/DL — SIGNIFICANT CHANGE UP
HCG SERPL QL: NEGATIVE — SIGNIFICANT CHANGE UP
HCT VFR BLD CALC: 36.2 % — LOW (ref 37–47)
HGB BLD-MCNC: 12.1 G/DL — SIGNIFICANT CHANGE UP (ref 12–16)
IMM GRANULOCYTES NFR BLD AUTO: 0.5 % — HIGH (ref 0.1–0.3)
KETONES UR-MCNC: 40
LACTATE SERPL-SCNC: 3.3 MMOL/L — HIGH (ref 0.7–2)
LEUKOCYTE ESTERASE UR-ACNC: NEGATIVE — SIGNIFICANT CHANGE UP
LIDOCAIN IGE QN: 25 U/L — SIGNIFICANT CHANGE UP (ref 7–60)
LYMPHOCYTES # BLD AUTO: 1.3 K/UL — SIGNIFICANT CHANGE UP (ref 1.2–3.4)
LYMPHOCYTES # BLD AUTO: 12.6 % — LOW (ref 20.5–51.1)
MCHC RBC-ENTMCNC: 28.3 PG — SIGNIFICANT CHANGE UP (ref 27–31)
MCHC RBC-ENTMCNC: 33.4 G/DL — SIGNIFICANT CHANGE UP (ref 32–37)
MCV RBC AUTO: 84.6 FL — SIGNIFICANT CHANGE UP (ref 81–99)
MONOCYTES # BLD AUTO: 0.39 K/UL — SIGNIFICANT CHANGE UP (ref 0.1–0.6)
MONOCYTES NFR BLD AUTO: 3.8 % — SIGNIFICANT CHANGE UP (ref 1.7–9.3)
NEUTROPHILS # BLD AUTO: 8.52 K/UL — HIGH (ref 1.4–6.5)
NEUTROPHILS NFR BLD AUTO: 82.3 % — HIGH (ref 42.2–75.2)
NITRITE UR-MCNC: NEGATIVE — SIGNIFICANT CHANGE UP
NRBC # BLD: 0 /100 WBCS — SIGNIFICANT CHANGE UP (ref 0–0)
PH UR: 6.5 — SIGNIFICANT CHANGE UP (ref 5–8)
PLATELET # BLD AUTO: 209 K/UL — SIGNIFICANT CHANGE UP (ref 130–400)
POTASSIUM SERPL-MCNC: 3.6 MMOL/L — SIGNIFICANT CHANGE UP (ref 3.5–5)
POTASSIUM SERPL-SCNC: 3.6 MMOL/L — SIGNIFICANT CHANGE UP (ref 3.5–5)
PROT SERPL-MCNC: 7.4 G/DL — SIGNIFICANT CHANGE UP (ref 6–8)
PROT UR-MCNC: NEGATIVE MG/DL — SIGNIFICANT CHANGE UP
RBC # BLD: 4.28 M/UL — SIGNIFICANT CHANGE UP (ref 4.2–5.4)
RBC # FLD: 13 % — SIGNIFICANT CHANGE UP (ref 11.5–14.5)
SARS-COV-2 RNA SPEC QL NAA+PROBE: SIGNIFICANT CHANGE UP
SODIUM SERPL-SCNC: 137 MMOL/L — SIGNIFICANT CHANGE UP (ref 135–146)
SP GR SPEC: 1.01 — SIGNIFICANT CHANGE UP (ref 1.01–1.03)
UROBILINOGEN FLD QL: 0.2 MG/DL — SIGNIFICANT CHANGE UP
WBC # BLD: 10.34 K/UL — SIGNIFICANT CHANGE UP (ref 4.8–10.8)
WBC # FLD AUTO: 10.34 K/UL — SIGNIFICANT CHANGE UP (ref 4.8–10.8)

## 2022-10-10 PROCEDURE — 74177 CT ABD & PELVIS W/CONTRAST: CPT | Mod: 26,MA

## 2022-10-10 PROCEDURE — L9981: CPT

## 2022-10-10 PROCEDURE — 76830 TRANSVAGINAL US NON-OB: CPT | Mod: 26

## 2022-10-10 PROCEDURE — 99285 EMERGENCY DEPT VISIT HI MDM: CPT

## 2022-10-10 RX ORDER — INFLUENZA VIRUS VACCINE 15; 15; 15; 15 UG/.5ML; UG/.5ML; UG/.5ML; UG/.5ML
0.5 SUSPENSION INTRAMUSCULAR ONCE
Refills: 0 | Status: DISCONTINUED | OUTPATIENT
Start: 2022-10-10 | End: 2022-10-12

## 2022-10-10 RX ORDER — SODIUM CHLORIDE 9 MG/ML
1000 INJECTION INTRAMUSCULAR; INTRAVENOUS; SUBCUTANEOUS ONCE
Refills: 0 | Status: COMPLETED | OUTPATIENT
Start: 2022-10-10 | End: 2022-10-10

## 2022-10-10 RX ORDER — CEFOTETAN DISODIUM 1 G
2 VIAL (EA) INJECTION ONCE
Refills: 0 | Status: COMPLETED | OUTPATIENT
Start: 2022-10-10 | End: 2022-10-10

## 2022-10-10 RX ORDER — MORPHINE SULFATE 50 MG/1
4 CAPSULE, EXTENDED RELEASE ORAL ONCE
Refills: 0 | Status: DISCONTINUED | OUTPATIENT
Start: 2022-10-10 | End: 2022-10-10

## 2022-10-10 RX ORDER — METRONIDAZOLE 500 MG
500 TABLET ORAL ONCE
Refills: 0 | Status: COMPLETED | OUTPATIENT
Start: 2022-10-10 | End: 2022-10-10

## 2022-10-10 RX ORDER — ACETAMINOPHEN 500 MG
650 TABLET ORAL EVERY 6 HOURS
Refills: 0 | Status: DISCONTINUED | OUTPATIENT
Start: 2022-10-10 | End: 2022-10-12

## 2022-10-10 RX ORDER — ACETAMINOPHEN 500 MG
975 TABLET ORAL ONCE
Refills: 0 | Status: COMPLETED | OUTPATIENT
Start: 2022-10-10 | End: 2022-10-10

## 2022-10-10 RX ORDER — CEFOTETAN DISODIUM 1 G
2 VIAL (EA) INJECTION EVERY 12 HOURS
Refills: 0 | Status: DISCONTINUED | OUTPATIENT
Start: 2022-10-10 | End: 2022-10-12

## 2022-10-10 RX ORDER — METRONIDAZOLE 500 MG
500 TABLET ORAL EVERY 8 HOURS
Refills: 0 | Status: DISCONTINUED | OUTPATIENT
Start: 2022-10-10 | End: 2022-10-12

## 2022-10-10 RX ORDER — LANOLIN ALCOHOL/MO/W.PET/CERES
3 CREAM (GRAM) TOPICAL AT BEDTIME
Refills: 0 | Status: DISCONTINUED | OUTPATIENT
Start: 2022-10-10 | End: 2022-10-12

## 2022-10-10 RX ORDER — ONDANSETRON 8 MG/1
4 TABLET, FILM COATED ORAL EVERY 8 HOURS
Refills: 0 | Status: DISCONTINUED | OUTPATIENT
Start: 2022-10-10 | End: 2022-10-12

## 2022-10-10 RX ORDER — SODIUM CHLORIDE 9 MG/ML
1000 INJECTION INTRAMUSCULAR; INTRAVENOUS; SUBCUTANEOUS
Refills: 0 | Status: DISCONTINUED | OUTPATIENT
Start: 2022-10-10 | End: 2022-10-12

## 2022-10-10 RX ORDER — ONDANSETRON 8 MG/1
4 TABLET, FILM COATED ORAL ONCE
Refills: 0 | Status: COMPLETED | OUTPATIENT
Start: 2022-10-10 | End: 2022-10-10

## 2022-10-10 RX ADMIN — MORPHINE SULFATE 4 MILLIGRAM(S): 50 CAPSULE, EXTENDED RELEASE ORAL at 07:33

## 2022-10-10 RX ADMIN — Medication 100 MILLIGRAM(S): at 23:15

## 2022-10-10 RX ADMIN — Medication 100 GRAM(S): at 13:52

## 2022-10-10 RX ADMIN — SODIUM CHLORIDE 1000 MILLILITER(S): 9 INJECTION INTRAMUSCULAR; INTRAVENOUS; SUBCUTANEOUS at 07:33

## 2022-10-10 RX ADMIN — Medication 100 MILLIGRAM(S): at 15:31

## 2022-10-10 RX ADMIN — Medication 650 MILLIGRAM(S): at 21:35

## 2022-10-10 RX ADMIN — Medication 975 MILLIGRAM(S): at 11:56

## 2022-10-10 RX ADMIN — SODIUM CHLORIDE 100 MILLILITER(S): 9 INJECTION INTRAMUSCULAR; INTRAVENOUS; SUBCUTANEOUS at 22:13

## 2022-10-10 RX ADMIN — ONDANSETRON 4 MILLIGRAM(S): 8 TABLET, FILM COATED ORAL at 07:33

## 2022-10-10 RX ADMIN — MORPHINE SULFATE 4 MILLIGRAM(S): 50 CAPSULE, EXTENDED RELEASE ORAL at 08:11

## 2022-10-10 RX ADMIN — SODIUM CHLORIDE 1000 MILLILITER(S): 9 INJECTION INTRAMUSCULAR; INTRAVENOUS; SUBCUTANEOUS at 19:12

## 2022-10-10 RX ADMIN — SODIUM CHLORIDE 1000 MILLILITER(S): 9 INJECTION INTRAMUSCULAR; INTRAVENOUS; SUBCUTANEOUS at 14:01

## 2022-10-10 NOTE — ED PROVIDER NOTE - CARE PROVIDER_API CALL
Alex Jackson)  Obstetrics and Gynecology  [facility] [place] [address]  Phone: [phone]  Fax: [phone]  Follow Up Time: 1-3 Days

## 2022-10-10 NOTE — ED PROVIDER NOTE - NS ED ATTENDING STATEMENT MOD
This was a shared visit with the TALI. I reviewed and verified the documentation and independently performed the documented:

## 2022-10-10 NOTE — ED PROVIDER NOTE - NSFOLLOWUPINSTRUCTIONS_ED_ALL_ED_FT
**Follow up with Dr Jackson at your appointment on Friday for your surgical procedure**  return to ER as needed for pain

## 2022-10-10 NOTE — ED PROVIDER NOTE - PROGRESS NOTE DETAILS
NC: pt refused pelvic exam at this time. NC: pain well controlled at this time. spoke to Dr Jackson, aware of all results, pt stable for dc and follow up on friday for robotic surgery NC: rpt vitals performed prior to dc, pt now febrile and tachycardic. will admit at this time.

## 2022-10-10 NOTE — H&P ADULT - ASSESSMENT
22F , PMHx of Endometrioses b/l ovarian cysts presents to ED with complaints of low abdominal pain since this AM, admitted to GYN service for further management    #Abdominal pain 2/2 right hydrosalpinx   - admit to GYN service  - Cefotetan 2gm IVPB q12h   - Metronidazole 500mg IVPB q8h  - APAP PRN for pain control  - 0.9%NS at 100/hr with 1L bolus now  - recheck lactate in AM  - recheck CBC/CMP in AM  - no need for BCx currently, TMAX 100.3, normal WBC.  Will check BCx if patient spikes fever  - FU UCx collected in ED  - Scheduled for robotic salpingectomy 10/14, inpatient course to decide operative management  - d/w Dr. Jackson

## 2022-10-10 NOTE — ED PROVIDER NOTE - CARE PLAN
Principal Discharge DX:	Endometrioma   1 Principal Discharge DX:	Endometrioma  Secondary Diagnosis:	Fever

## 2022-10-10 NOTE — ED PROVIDER NOTE - ATTENDING APP SHARED VISIT CONTRIBUTION OF CARE
Patient complains of lower abdominal pain.  She has a known history of a endometrioma, and is followed by Dr. Jackson.  Vital signs noted.  Appears uncomfortable.  There is lower abdominal tenderness to palpation with mild guarding.  Mild hypotension noted.  I would assume low normal blood pressures is common for this patient in view her body habitus.  Nonetheless, IV fluid administered.  Patient required morphine to ease pain.  Case is discussed with Dr. Jackson.  Patient may require operative intervention prior to planned elective intervention on Friday.  Patient will be admitted for continued close observation of vital signs.

## 2022-10-10 NOTE — ED PROVIDER NOTE - CLINICAL SUMMARY MEDICAL DECISION MAKING FREE TEXT BOX
In my opinion, in patient treatment is medically justifiable and appropriate.  Review of blood pressure, increased pulse, and temperature, patient needs IV fluid and IV antibiotics.  Patient may require operative intervention prior to planned elective surgery on Friday. See attending note for documentation of medical decision making.

## 2022-10-10 NOTE — ED PROVIDER NOTE - NS ED ROS FT
Constitutional: No fever, chills.  Eyes:  No visual changes  ENMT:  No neck pain  Cardiac:  No chest pain  Respiratory:  No cough, SOB  GI:  (+)abd pain, nausea, vomiting.   :  No dysuria, hematuria  MS:  No back pain.  Neuro:  No headache or lightheadedness  Skin:  No skin rash  Endocrine: No history of thyroid disease or diabetes.  Except as documented in the HPI,  all other systems are negative.

## 2022-10-10 NOTE — H&P ADULT - NSHPLABSRESULTS_GEN_ALL_CORE
12.1   10.34 )-----------( 209      ( 10 Oct 2022 07:20 )             36.2       10-10    137  |  101  |  11  ----------------------------<  143<H>  3.6   |  24  |  0.6<L>    Ca    9.6      10 Oct 2022 07:20    TPro  7.4  /  Alb  4.9  /  TBili  0.3  /  DBili  x   /  AST  19  /  ALT  19  /  AlkPhos  72  10-10                  Urinalysis Basic - ( 10 Oct 2022 14:00 )    Color: Yellow / Appearance: Clear / S.015 / pH: x  Gluc: x / Ketone: 40  / Bili: Negative / Urobili: 0.2 mg/dL   Blood: x / Protein: Negative mg/dL / Nitrite: Negative   Leuk Esterase: Negative / RBC: x / WBC x   Sq Epi: x / Non Sq Epi: x / Bacteria: x            Lactate Trend  10-10 @ 07:20 Lactate:3.3     CT A/P w/IV     IMPRESSION: 7.7 cm tubular right adnexal complex cystic structure,   differential includes a complex hydrosalpinx or endometrioma when   correlated to the same date pelvic sonogram.    Small abdominal pelvic ascites, and fecalization of several small bowel   loops, likely reflecting ileus.    US Transvaginal    IMPRESSION:    Large right adnexal lesion measuring 10.0 cm, noting a similar appearing  7.6 cm lesion has been noted on prior sonogram of 2022. Assuming   this is in the right ovary, a large endometrioma is suspected. If this is   not within the ovary, complex hydrosalpinx such as hematosalpinx is also   on the differential. GYN evaluation recommended.            Serum Pregnancy: Negative (10-10-22 @ 07:20)

## 2022-10-10 NOTE — H&P ADULT - NSHPPHYSICALEXAM_GEN_ALL_CORE
Vital Signs (24 Hrs):  T(C): 37.2 (10-10-22 @ 13:53), Max: 37.9 (10-10-22 @ 11:29)  HR: 110 (10-10-22 @ 13:53) (91 - 112)  BP: 87/62 (10-10-22 @ 13:53) (87/62 - 111/56)  RR: 17 (10-10-22 @ 13:53) (17 - 18)  SpO2: 98% (10-10-22 @ 13:53) (98% - 100%)    PHYSICAL EXAM:  GENERAL: NAD, well-developed  SKIN: No rashes or lesions  HEAD:  Atraumatic, Normocephalic  EYES: EOMI, PERRLA, conjunctiva and sclera clear  NECK: Supple, No JVD  CHEST/LUNG: Clear to auscultation bilaterally; No wheeze  HEART: Regular rate and rhythm; No murmurs, rubs, or gallops  ABDOMEN: Soft, +TTP RLQ/LLQ.  No rebound/guarding  EXTREMITIES:  No clubbing, cyanosis, or edema  CNS: AAOx3

## 2022-10-10 NOTE — ED PROVIDER NOTE - OBJECTIVE STATEMENT
22-year-old female past medical history of ovarian cyst angle 7.6 cm endometrioma diagnosed in January 2022 OB/GYN Dr. Jackson presenting to the ED for evaluation of sudden onset of severe lower abdominal/pelvic pain associated with nausea and multiple episodes of vomiting x1 hour.  Patient reported she is scheduled for surgery on Friday with Dr. Jackson for her endometrioma.  Denies fever, chills, dysuria, hematuria, vaginal bleeding, vaginal discharge.

## 2022-10-10 NOTE — ED PROVIDER NOTE - PATIENT PORTAL LINK FT
You can access the FollowMyHealth Patient Portal offered by [facility] by registering at the following website: [email or web address]. By joining [employer]’s FollowMyHealth portal, you will also be able to view your health information using other applications (apps) compatible with our system.

## 2022-10-10 NOTE — H&P ADULT - HISTORY OF PRESENT ILLNESS
22F , PMHx of Endometrioses b/l ovarian cysts presents to ED with complaints of low abdominal pain since this AM.  Patient reports the pain as cramping, constant and in RLQ and LLQ.  She reports the pain as severe and associated with Nausea and vomiting x 3.  Denies any fever/chills.  No dysuria/frequency  No vaginal discharge.  Denies any sexual activity.

## 2022-10-10 NOTE — ED PROVIDER NOTE - PHYSICAL EXAMINATION
GENERAL: Well-nourished, Well-developed. NAD.  HEAD: No visible or palpable bumps or hematomas. No ecchymosis behind ears B/L.  Eyes: PERRLA, EOMI. No asymmetry. No nystagmus. No conjunctival injection. Non-icteric sclera.  ENMT: MMM.   Neck: Supple. FROM  CVS: RRR. Normal S1,S2. No murmurs appreciated on auscultation   RESP: No use of accessory muscles. Chest rise symmetrical with good expansion. Lungs clear to auscultation B/L. No wheezing, rales, or rhonchi auscultated.  GI: Normal auscultation of bowel sounds in all 4 quadrants. (+)diffuse ttp. Soft, Nondistended. No guarding or rebound tenderness. No CVAT B/L.  Skin: Warm, Dry. No rashes or lesions. Good cap refill < 2 sec B/L.  EXT: Radial and pedal pulses present B/L. No calf tenderness or swelling B/L. No palpable cords. No pedal edema B/L.

## 2022-10-11 LAB
ABO RH CONFIRMATION: SIGNIFICANT CHANGE UP
ALBUMIN SERPL ELPH-MCNC: 3.3 G/DL — LOW (ref 3.5–5.2)
ALBUMIN SERPL ELPH-MCNC: 3.8 G/DL — SIGNIFICANT CHANGE UP (ref 3.5–5.2)
ALP SERPL-CCNC: 50 U/L — SIGNIFICANT CHANGE UP (ref 30–115)
ALP SERPL-CCNC: 55 U/L — SIGNIFICANT CHANGE UP (ref 30–115)
ALT FLD-CCNC: 16 U/L — SIGNIFICANT CHANGE UP (ref 0–41)
ALT FLD-CCNC: 24 U/L — SIGNIFICANT CHANGE UP (ref 0–41)
ANION GAP SERPL CALC-SCNC: 7 MMOL/L — SIGNIFICANT CHANGE UP (ref 7–14)
ANION GAP SERPL CALC-SCNC: 9 MMOL/L — SIGNIFICANT CHANGE UP (ref 7–14)
AST SERPL-CCNC: 20 U/L — SIGNIFICANT CHANGE UP (ref 0–41)
AST SERPL-CCNC: 33 U/L — SIGNIFICANT CHANGE UP (ref 0–41)
BASOPHILS # BLD AUTO: 0.02 K/UL — SIGNIFICANT CHANGE UP (ref 0–0.2)
BASOPHILS # BLD AUTO: 0.03 K/UL — SIGNIFICANT CHANGE UP (ref 0–0.2)
BASOPHILS NFR BLD AUTO: 0.2 % — SIGNIFICANT CHANGE UP (ref 0–1)
BASOPHILS NFR BLD AUTO: 0.3 % — SIGNIFICANT CHANGE UP (ref 0–1)
BILIRUB SERPL-MCNC: 0.7 MG/DL — SIGNIFICANT CHANGE UP (ref 0.2–1.2)
BILIRUB SERPL-MCNC: 0.7 MG/DL — SIGNIFICANT CHANGE UP (ref 0.2–1.2)
BLD GP AB SCN SERPL QL: SIGNIFICANT CHANGE UP
BUN SERPL-MCNC: 6 MG/DL — LOW (ref 10–20)
BUN SERPL-MCNC: 7 MG/DL — LOW (ref 10–20)
CALCIUM SERPL-MCNC: 7.6 MG/DL — LOW (ref 8.4–10.5)
CALCIUM SERPL-MCNC: 8.1 MG/DL — LOW (ref 8.4–10.5)
CHLORIDE SERPL-SCNC: 105 MMOL/L — SIGNIFICANT CHANGE UP (ref 98–110)
CHLORIDE SERPL-SCNC: 107 MMOL/L — SIGNIFICANT CHANGE UP (ref 98–110)
CO2 SERPL-SCNC: 23 MMOL/L — SIGNIFICANT CHANGE UP (ref 17–32)
CO2 SERPL-SCNC: 23 MMOL/L — SIGNIFICANT CHANGE UP (ref 17–32)
CREAT SERPL-MCNC: 0.5 MG/DL — LOW (ref 0.7–1.5)
CREAT SERPL-MCNC: 0.6 MG/DL — LOW (ref 0.7–1.5)
CULTURE RESULTS: SIGNIFICANT CHANGE UP
EGFR: 130 ML/MIN/1.73M2 — SIGNIFICANT CHANGE UP
EGFR: 136 ML/MIN/1.73M2 — SIGNIFICANT CHANGE UP
EOSINOPHIL # BLD AUTO: 0.02 K/UL — SIGNIFICANT CHANGE UP (ref 0–0.7)
EOSINOPHIL # BLD AUTO: 0.05 K/UL — SIGNIFICANT CHANGE UP (ref 0–0.7)
EOSINOPHIL NFR BLD AUTO: 0.2 % — SIGNIFICANT CHANGE UP (ref 0–8)
EOSINOPHIL NFR BLD AUTO: 0.6 % — SIGNIFICANT CHANGE UP (ref 0–8)
GLUCOSE SERPL-MCNC: 120 MG/DL — HIGH (ref 70–99)
GLUCOSE SERPL-MCNC: 87 MG/DL — SIGNIFICANT CHANGE UP (ref 70–99)
HCT VFR BLD CALC: 27.7 % — LOW (ref 37–47)
HCT VFR BLD CALC: 30.6 % — LOW (ref 37–47)
HCT VFR BLD CALC: 31.4 % — LOW (ref 37–47)
HCT VFR BLD CALC: 34.4 % — LOW (ref 37–47)
HGB BLD-MCNC: 10.2 G/DL — LOW (ref 12–16)
HGB BLD-MCNC: 11 G/DL — LOW (ref 12–16)
HGB BLD-MCNC: 9.1 G/DL — LOW (ref 12–16)
HGB BLD-MCNC: 9.9 G/DL — LOW (ref 12–16)
IMM GRANULOCYTES NFR BLD AUTO: 0.2 % — SIGNIFICANT CHANGE UP (ref 0.1–0.3)
IMM GRANULOCYTES NFR BLD AUTO: 0.3 % — SIGNIFICANT CHANGE UP (ref 0.1–0.3)
LACTATE SERPL-SCNC: 0.5 MMOL/L — LOW (ref 0.7–2)
LYMPHOCYTES # BLD AUTO: 1.12 K/UL — LOW (ref 1.2–3.4)
LYMPHOCYTES # BLD AUTO: 1.26 K/UL — SIGNIFICANT CHANGE UP (ref 1.2–3.4)
LYMPHOCYTES # BLD AUTO: 15.3 % — LOW (ref 20.5–51.1)
LYMPHOCYTES # BLD AUTO: 9.8 % — LOW (ref 20.5–51.1)
MCHC RBC-ENTMCNC: 27.8 PG — SIGNIFICANT CHANGE UP (ref 27–31)
MCHC RBC-ENTMCNC: 28.1 PG — SIGNIFICANT CHANGE UP (ref 27–31)
MCHC RBC-ENTMCNC: 28.2 PG — SIGNIFICANT CHANGE UP (ref 27–31)
MCHC RBC-ENTMCNC: 28.7 PG — SIGNIFICANT CHANGE UP (ref 27–31)
MCHC RBC-ENTMCNC: 32 G/DL — SIGNIFICANT CHANGE UP (ref 32–37)
MCHC RBC-ENTMCNC: 32.4 G/DL — SIGNIFICANT CHANGE UP (ref 32–37)
MCHC RBC-ENTMCNC: 32.5 G/DL — SIGNIFICANT CHANGE UP (ref 32–37)
MCHC RBC-ENTMCNC: 32.9 G/DL — SIGNIFICANT CHANGE UP (ref 32–37)
MCV RBC AUTO: 86.7 FL — SIGNIFICANT CHANGE UP (ref 81–99)
MCV RBC AUTO: 86.9 FL — SIGNIFICANT CHANGE UP (ref 81–99)
MCV RBC AUTO: 87.1 FL — SIGNIFICANT CHANGE UP (ref 81–99)
MCV RBC AUTO: 87.4 FL — SIGNIFICANT CHANGE UP (ref 81–99)
MONOCYTES # BLD AUTO: 0.6 K/UL — SIGNIFICANT CHANGE UP (ref 0.1–0.6)
MONOCYTES # BLD AUTO: 0.66 K/UL — HIGH (ref 0.1–0.6)
MONOCYTES NFR BLD AUTO: 5.8 % — SIGNIFICANT CHANGE UP (ref 1.7–9.3)
MONOCYTES NFR BLD AUTO: 7.3 % — SIGNIFICANT CHANGE UP (ref 1.7–9.3)
NEUTROPHILS # BLD AUTO: 6.31 K/UL — SIGNIFICANT CHANGE UP (ref 1.4–6.5)
NEUTROPHILS # BLD AUTO: 9.53 K/UL — HIGH (ref 1.4–6.5)
NEUTROPHILS NFR BLD AUTO: 76.4 % — HIGH (ref 42.2–75.2)
NEUTROPHILS NFR BLD AUTO: 83.6 % — HIGH (ref 42.2–75.2)
NRBC # BLD: 0 /100 WBCS — SIGNIFICANT CHANGE UP (ref 0–0)
PLATELET # BLD AUTO: 130 K/UL — SIGNIFICANT CHANGE UP (ref 130–400)
PLATELET # BLD AUTO: 156 K/UL — SIGNIFICANT CHANGE UP (ref 130–400)
PLATELET # BLD AUTO: 161 K/UL — SIGNIFICANT CHANGE UP (ref 130–400)
PLATELET # BLD AUTO: 208 K/UL — SIGNIFICANT CHANGE UP (ref 130–400)
POTASSIUM SERPL-MCNC: 3.4 MMOL/L — LOW (ref 3.5–5)
POTASSIUM SERPL-MCNC: 3.5 MMOL/L — SIGNIFICANT CHANGE UP (ref 3.5–5)
POTASSIUM SERPL-SCNC: 3.4 MMOL/L — LOW (ref 3.5–5)
POTASSIUM SERPL-SCNC: 3.5 MMOL/L — SIGNIFICANT CHANGE UP (ref 3.5–5)
PROT SERPL-MCNC: 5.1 G/DL — LOW (ref 6–8)
PROT SERPL-MCNC: 5.7 G/DL — LOW (ref 6–8)
RBC # BLD: 3.17 M/UL — LOW (ref 4.2–5.4)
RBC # BLD: 3.52 M/UL — LOW (ref 4.2–5.4)
RBC # BLD: 3.62 M/UL — LOW (ref 4.2–5.4)
RBC # BLD: 3.95 M/UL — LOW (ref 4.2–5.4)
RBC # FLD: 13.2 % — SIGNIFICANT CHANGE UP (ref 11.5–14.5)
SODIUM SERPL-SCNC: 137 MMOL/L — SIGNIFICANT CHANGE UP (ref 135–146)
SODIUM SERPL-SCNC: 137 MMOL/L — SIGNIFICANT CHANGE UP (ref 135–146)
SPECIMEN SOURCE: SIGNIFICANT CHANGE UP
WBC # BLD: 10.4 K/UL — SIGNIFICANT CHANGE UP (ref 4.8–10.8)
WBC # BLD: 11.39 K/UL — HIGH (ref 4.8–10.8)
WBC # BLD: 15.71 K/UL — HIGH (ref 4.8–10.8)
WBC # BLD: 8.26 K/UL — SIGNIFICANT CHANGE UP (ref 4.8–10.8)
WBC # FLD AUTO: 10.4 K/UL — SIGNIFICANT CHANGE UP (ref 4.8–10.8)
WBC # FLD AUTO: 11.39 K/UL — HIGH (ref 4.8–10.8)
WBC # FLD AUTO: 15.71 K/UL — HIGH (ref 4.8–10.8)
WBC # FLD AUTO: 8.26 K/UL — SIGNIFICANT CHANGE UP (ref 4.8–10.8)

## 2022-10-11 RX ORDER — OXYCODONE AND ACETAMINOPHEN 5; 325 MG/1; MG/1
1 TABLET ORAL EVERY 4 HOURS
Refills: 0 | Status: DISCONTINUED | OUTPATIENT
Start: 2022-10-11 | End: 2022-10-12

## 2022-10-11 RX ORDER — POTASSIUM CHLORIDE 20 MEQ
40 PACKET (EA) ORAL ONCE
Refills: 0 | Status: COMPLETED | OUTPATIENT
Start: 2022-10-11 | End: 2022-10-11

## 2022-10-11 RX ORDER — SODIUM CHLORIDE 9 MG/ML
1000 INJECTION INTRAMUSCULAR; INTRAVENOUS; SUBCUTANEOUS ONCE
Refills: 0 | Status: DISCONTINUED | OUTPATIENT
Start: 2022-10-11 | End: 2022-10-12

## 2022-10-11 RX ORDER — MORPHINE SULFATE 50 MG/1
2 CAPSULE, EXTENDED RELEASE ORAL ONCE
Refills: 0 | Status: DISCONTINUED | OUTPATIENT
Start: 2022-10-11 | End: 2022-10-12

## 2022-10-11 RX ADMIN — Medication 100 MILLIGRAM(S): at 21:59

## 2022-10-11 RX ADMIN — Medication 100 MILLIGRAM(S): at 13:41

## 2022-10-11 RX ADMIN — OXYCODONE AND ACETAMINOPHEN 1 TABLET(S): 5; 325 TABLET ORAL at 09:20

## 2022-10-11 RX ADMIN — OXYCODONE AND ACETAMINOPHEN 1 TABLET(S): 5; 325 TABLET ORAL at 08:46

## 2022-10-11 RX ADMIN — Medication 650 MILLIGRAM(S): at 07:30

## 2022-10-11 RX ADMIN — Medication 40 MILLIEQUIVALENT(S): at 12:56

## 2022-10-11 RX ADMIN — Medication 100 MILLIGRAM(S): at 06:53

## 2022-10-11 RX ADMIN — Medication 100 GRAM(S): at 17:27

## 2022-10-11 RX ADMIN — Medication 100 GRAM(S): at 06:53

## 2022-10-11 NOTE — CHART NOTE - NSCHARTNOTEFT_GEN_A_CORE
Complete Blood Count (10.11.22 @ 19:32)   Nucleated RBC: 0 /100 WBCs   WBC Count: 15.71 K/uL   RBC Count: 3.95 M/uL   Hemoglobin: 11.0 g/dL   Hematocrit: 34.4 %   Mean Cell Volume: 87.1 fL   Mean Cell Hemoglobin: 27.8 pg   Mean Cell Hemoglobin Conc: 32.0 g/dL   Red Cell Distrib Width: 13.2 %   Platelet Count - Automated: 208 K/uL   H/H improved since last cbc.  WBC elevated to 15.7 from 10.4  Pt is on Cefotetan and flagyl.  repeat labs in am.  D/W Dr Jackson @ 8:50pm.

## 2022-10-11 NOTE — CHART NOTE - NSCHARTNOTEFT_GEN_A_CORE
Called by RN, reports patient with complaints of worsening abdominal pain.  Patient assessed at bedside, PE essentially unchanged from last night with TTP RLQ/LLQ.  VSS.  No relief from APAP.  Refusing MSO4.  Will try Percocet 5/325 PO x 1 now.  CBC at 1230 w/decreased Hgb to 9.9, FU at 0630 9.1.  Will recheck at 1100.  d/w GYN attending.

## 2022-10-12 ENCOUNTER — TRANSCRIPTION ENCOUNTER (OUTPATIENT)
Age: 22
End: 2022-10-12

## 2022-10-12 VITALS — DIASTOLIC BLOOD PRESSURE: 54 MMHG | TEMPERATURE: 98 F | HEART RATE: 102 BPM | SYSTOLIC BLOOD PRESSURE: 91 MMHG

## 2022-10-12 LAB
ANION GAP SERPL CALC-SCNC: 7 MMOL/L — SIGNIFICANT CHANGE UP (ref 7–14)
BUN SERPL-MCNC: 4 MG/DL — LOW (ref 10–20)
CALCIUM SERPL-MCNC: 8.3 MG/DL — LOW (ref 8.4–10.5)
CHLORIDE SERPL-SCNC: 105 MMOL/L — SIGNIFICANT CHANGE UP (ref 98–110)
CO2 SERPL-SCNC: 27 MMOL/L — SIGNIFICANT CHANGE UP (ref 17–32)
CREAT SERPL-MCNC: 0.6 MG/DL — LOW (ref 0.7–1.5)
EGFR: 130 ML/MIN/1.73M2 — SIGNIFICANT CHANGE UP
GLUCOSE SERPL-MCNC: 91 MG/DL — SIGNIFICANT CHANGE UP (ref 70–99)
HCT VFR BLD CALC: 29.1 % — LOW (ref 37–47)
HGB BLD-MCNC: 9.6 G/DL — LOW (ref 12–16)
MCHC RBC-ENTMCNC: 28.4 PG — SIGNIFICANT CHANGE UP (ref 27–31)
MCHC RBC-ENTMCNC: 33 G/DL — SIGNIFICANT CHANGE UP (ref 32–37)
MCV RBC AUTO: 86.1 FL — SIGNIFICANT CHANGE UP (ref 81–99)
NRBC # BLD: 0 /100 WBCS — SIGNIFICANT CHANGE UP (ref 0–0)
PLATELET # BLD AUTO: 167 K/UL — SIGNIFICANT CHANGE UP (ref 130–400)
POTASSIUM SERPL-MCNC: 3.2 MMOL/L — LOW (ref 3.5–5)
POTASSIUM SERPL-SCNC: 3.2 MMOL/L — LOW (ref 3.5–5)
RBC # BLD: 3.38 M/UL — LOW (ref 4.2–5.4)
RBC # FLD: 13.2 % — SIGNIFICANT CHANGE UP (ref 11.5–14.5)
SARS-COV-2 RNA SPEC QL NAA+PROBE: SIGNIFICANT CHANGE UP
SODIUM SERPL-SCNC: 139 MMOL/L — SIGNIFICANT CHANGE UP (ref 135–146)
WBC # BLD: 10.87 K/UL — HIGH (ref 4.8–10.8)
WBC # FLD AUTO: 10.87 K/UL — HIGH (ref 4.8–10.8)

## 2022-10-12 PROCEDURE — 99238 HOSP IP/OBS DSCHRG MGMT 30/<: CPT | Mod: GC

## 2022-10-12 RX ORDER — ACETAMINOPHEN 500 MG
2 TABLET ORAL
Qty: 0 | Refills: 0 | DISCHARGE
Start: 2022-10-12

## 2022-10-12 RX ORDER — METRONIDAZOLE 500 MG
1 TABLET ORAL
Qty: 6 | Refills: 0
Start: 2022-10-12 | End: 2022-10-14

## 2022-10-12 RX ORDER — CEPHALEXIN 500 MG
1 CAPSULE ORAL
Qty: 6 | Refills: 0
Start: 2022-10-12 | End: 2022-10-14

## 2022-10-12 RX ADMIN — Medication 100 GRAM(S): at 05:33

## 2022-10-12 RX ADMIN — Medication 100 MILLIGRAM(S): at 05:33

## 2022-10-12 NOTE — DISCHARGE NOTE PROVIDER - NSDCMRMEDTOKEN_GEN_ALL_CORE_FT
acetaminophen 325 mg oral tablet: 2 tab(s) orally every 6 hours, As needed, Temp greater or equal to 38C (100.4F), Mild Pain (1 - 3)  cephalexin 500 mg oral tablet: 1 tab(s) orally 2 times a day MDD:2 tablets  metroNIDAZOLE 500 mg oral tablet: 1 tab(s) orally 2 times a day MDD:2 tablets

## 2022-10-12 NOTE — DISCHARGE NOTE NURSING/CASE MANAGEMENT/SOCIAL WORK - NSDCPEFALRISK_GEN_ALL_CORE
For information on Fall & Injury Prevention, visit: [email or web address] OR  [email or web address] OR  https://www.cdc.gov/steadi/patient.html

## 2022-10-12 NOTE — DISCHARGE NOTE PROVIDER - CARE PROVIDER_API CALL
Alex Jackson)  Obstetrics and Gynecology  [facility] [place] [address]  Phone: [phone]  Fax: [phone]  Follow Up Time:

## 2022-10-12 NOTE — PROGRESS NOTE ADULT - ASSESSMENT
She is S/P IV antibiotics x 48 hours.  Her WBC is back down to 10, and her hemoglobin is stable around 10 as well.   I will get her covid test done on her today, and if negative, will perform her robotic surgery as previously planned , on Friday at [place].   My final assessment is that she most likely had a hemorrhagic cyst that bled  and stabilized, or that her hematosalpinx was leaking blood and sealed off.  One way or the other, we will have the answers on Friday when we do her surgery.    She will be discharged home on PO Keflex 500 BID, and PO Metronidazole 500 BID.   Discussed the plan with her in detail.

## 2022-10-12 NOTE — DISCHARGE NOTE NURSING/CASE MANAGEMENT/SOCIAL WORK - PATIENT PORTAL LINK FT
You can access the FollowMyHealth Patient Portal offered by [facility] by registering at the following website: [email or web address]. By joining [employer]’s FollowMyHealth portal, you will also be able to view your health information using other applications (apps) compatible with our system.
Patient

## 2022-10-12 NOTE — PROGRESS NOTE ADULT - SUBJECTIVE AND OBJECTIVE BOX
Patient feels much better today.  No more nausea or vomiting.  Tolerating diet , ambulating well, no dizziness or lightheadedness.  "I want to go home"....and I concur.    She is afebrile, her abdomen is soft , slightly tender on the right, but no rebound or guarding.  Her extremities are non-tender and non edematous.

## 2022-10-12 NOTE — DISCHARGE NOTE PROVIDER - HOSPITAL COURSE
21yo s/p admission for abx therapy for bilateral ovarian cysts.  Downtrending WBC and patient is afebrile.   Discharge home  Diagnostic lap Procedure scheduled for 10/14.

## 2022-10-12 NOTE — DISCHARGE NOTE PROVIDER - NSDCCPCAREPLAN_GEN_ALL_CORE_FT
PRINCIPAL DISCHARGE DIAGNOSIS  Diagnosis: Endometrioma  Assessment and Plan of Treatment:       SECONDARY DISCHARGE DIAGNOSES  Diagnosis: Fever  Assessment and Plan of Treatment:

## 2022-10-12 NOTE — DISCHARGE NOTE PROVIDER - NSDCFUSCHEDAPPT_GEN_ALL_CORE_FT
Alex Jackson Physician [facility]  [address]  Scheduled Appointment: 10/14/2022    Alex Jackson  [facility]  Scheduled Appointment: 10/14/2022    Alex Jackson Physician [facility]  [address]  Scheduled Appointment: 10/25/2022    Alex Jackson [facility]  [address]  Scheduled Appointment: 11/22/2022

## 2022-10-13 NOTE — ASU PATIENT PROFILE, ADULT - FALL HARM RISK - UNIVERSAL INTERVENTIONS
Bed in lowest position, wheels locked, appropriate side rails in place/Call bell, personal items and telephone in reach/Instruct patient to call for assistance before getting out of bed or chair/Non-slip footwear when patient is out of bed/[place] to call system/Physically safe environment - no spills, clutter or unnecessary equipment/Purposeful Proactive Rounding/Room/bathroom lighting operational, light cord in reach

## 2022-10-13 NOTE — ASU PATIENT PROFILE, ADULT - REASON FOR ADMISSION, PROFILE
pelvic examination under anesthesia, robotic excision of endometriosis, possible salpingectomy, possible ovarian cystectomy

## 2022-10-14 ENCOUNTER — TRANSCRIPTION ENCOUNTER (OUTPATIENT)
Age: 22
End: 2022-10-14

## 2022-10-14 ENCOUNTER — APPOINTMENT (OUTPATIENT)
Dept: OBGYN | Facility: HOSPITAL | Age: 22
End: 2022-10-14

## 2022-10-14 ENCOUNTER — OUTPATIENT (OUTPATIENT)
Dept: OUTPATIENT SERVICES | Facility: HOSPITAL | Age: 22
LOS: 1 days | Discharge: HOME | End: 2022-10-14

## 2022-10-14 VITALS
OXYGEN SATURATION: 100 % | RESPIRATION RATE: 17 BRPM | TEMPERATURE: 98 F | HEIGHT: 65 IN | SYSTOLIC BLOOD PRESSURE: 102 MMHG | HEART RATE: 102 BPM | DIASTOLIC BLOOD PRESSURE: 58 MMHG | WEIGHT: 106.92 LBS

## 2022-10-14 VITALS
OXYGEN SATURATION: 97 % | DIASTOLIC BLOOD PRESSURE: 61 MMHG | HEART RATE: 99 BPM | RESPIRATION RATE: 16 BRPM | SYSTOLIC BLOOD PRESSURE: 102 MMHG

## 2022-10-14 DIAGNOSIS — Z98.890 OTHER SPECIFIED POSTPROCEDURAL STATES: Chronic | ICD-10-CM

## 2022-10-14 PROCEDURE — 58660 LAPAROSCOPY LYSIS: CPT

## 2022-10-14 RX ORDER — BUPIVACAINE 13.3 MG/ML
20 INJECTION, SUSPENSION, LIPOSOMAL INFILTRATION ONCE
Refills: 0 | Status: DISCONTINUED | OUTPATIENT
Start: 2022-10-14 | End: 2022-10-14

## 2022-10-14 RX ORDER — HYDROMORPHONE HYDROCHLORIDE 2 MG/ML
0.5 INJECTION INTRAMUSCULAR; INTRAVENOUS; SUBCUTANEOUS
Refills: 0 | Status: DISCONTINUED | OUTPATIENT
Start: 2022-10-14 | End: 2022-10-14

## 2022-10-14 RX ORDER — NORETHINDRONE 0.35 MG/1
1 TABLET ORAL
Qty: 28 | Refills: 0
Start: 2022-10-14 | End: 2022-10-27

## 2022-10-14 RX ORDER — ONDANSETRON 8 MG/1
8 TABLET, FILM COATED ORAL ONCE
Refills: 0 | Status: DISCONTINUED | OUTPATIENT
Start: 2022-10-14 | End: 2022-10-14

## 2022-10-14 RX ORDER — IBUPROFEN 200 MG
1 TABLET ORAL
Qty: 40 | Refills: 0
Start: 2022-10-14 | End: 2022-10-23

## 2022-10-14 RX ORDER — SODIUM CHLORIDE 9 MG/ML
1000 INJECTION, SOLUTION INTRAVENOUS
Refills: 0 | Status: DISCONTINUED | OUTPATIENT
Start: 2022-10-14 | End: 2022-10-14

## 2022-10-14 RX ADMIN — SODIUM CHLORIDE 75 MILLILITER(S): 9 INJECTION, SOLUTION INTRAVENOUS at 17:20

## 2022-10-14 RX ADMIN — HYDROMORPHONE HYDROCHLORIDE 0.5 MILLIGRAM(S): 2 INJECTION INTRAMUSCULAR; INTRAVENOUS; SUBCUTANEOUS at 17:34

## 2022-10-14 NOTE — ASU DISCHARGE PLAN (ADULT/PEDIATRIC) - NS MD DC FALL RISK RISK
For information on Fall & Injury Prevention, visit: [email or web address] OR  [email or web address] OR  https://www.cdc.gov/steadi/patient.html

## 2022-10-14 NOTE — ASU PREOP CHECKLIST - 1.
OR JOHN Mckoy made aware Exparel ordered & IV to be placed in OR as per anesthesia. Entered chart to time out patient.

## 2022-10-14 NOTE — BRIEF OPERATIVE NOTE - OPERATION/FINDINGS
Normal external female genitalia, vaginal mucosa, and cervix. Hymen not intact  ~7cm anteverted uterus, normal bilateral fallopian tubes  Large endometrioma visualized on right ovary and left ovary containing small unruptured cyst  Severe endometriosis visualized on posterior cul-de-sac, retroperitoneal fibrosis

## 2022-10-14 NOTE — ASU DISCHARGE PLAN (ADULT/PEDIATRIC) - CARE PROVIDER_API CALL
Alex Jackson)  Obstetrics and Gynecology  [facility] [place] [address]  Phone: [phone]  Fax: [phone]  Follow Up Time:

## 2022-10-17 ENCOUNTER — NON-APPOINTMENT (OUTPATIENT)
Age: 22
End: 2022-10-17

## 2022-10-17 DIAGNOSIS — N70.11 CHRONIC SALPINGITIS: ICD-10-CM

## 2022-10-17 DIAGNOSIS — N80.121 DEEP ENDOMETRIOSIS OF RIGHT OVARY: ICD-10-CM

## 2022-10-17 DIAGNOSIS — Z20.822 CONTACT WITH AND (SUSPECTED) EXPOSURE TO COVID-19: ICD-10-CM

## 2022-10-17 DIAGNOSIS — R00.0 TACHYCARDIA, UNSPECIFIED: ICD-10-CM

## 2022-10-17 DIAGNOSIS — R50.9 FEVER, UNSPECIFIED: ICD-10-CM

## 2022-10-18 RX ORDER — ELAGOLIX 150 MG/1
150 TABLET, FILM COATED ORAL
Qty: 90 | Refills: 0 | Status: ACTIVE | COMMUNITY
Start: 2022-10-18 | End: 1900-01-01

## 2022-10-19 DIAGNOSIS — N80.129 DEEP ENDOMETRIOSIS OF OVARY, UNSPECIFIED OVARY: ICD-10-CM

## 2022-10-19 DIAGNOSIS — N83.6 HEMATOSALPINX: ICD-10-CM

## 2022-10-25 ENCOUNTER — APPOINTMENT (OUTPATIENT)
Dept: OBGYN | Facility: CLINIC | Age: 22
End: 2022-10-25

## 2022-10-25 ENCOUNTER — NON-APPOINTMENT (OUTPATIENT)
Age: 22
End: 2022-10-25

## 2022-10-25 VITALS
SYSTOLIC BLOOD PRESSURE: 111 MMHG | HEIGHT: 65 IN | BODY MASS INDEX: 18.16 KG/M2 | WEIGHT: 109 LBS | HEART RATE: 102 BPM | DIASTOLIC BLOOD PRESSURE: 76 MMHG

## 2022-10-25 PROCEDURE — 99024 POSTOP FOLLOW-UP VISIT: CPT

## 2022-10-25 RX ORDER — NORETHINDRONE ACETATE 5 MG/1
5 TABLET ORAL
Qty: 180 | Refills: 0 | Status: ACTIVE | COMMUNITY
Start: 2022-10-25 | End: 1900-01-01

## 2022-11-16 ENCOUNTER — APPOINTMENT (OUTPATIENT)
Dept: OBGYN | Facility: CLINIC | Age: 22
End: 2022-11-16

## 2022-11-16 VITALS
BODY MASS INDEX: 18.16 KG/M2 | HEART RATE: 91 BPM | WEIGHT: 109 LBS | HEIGHT: 65 IN | TEMPERATURE: 98 F | DIASTOLIC BLOOD PRESSURE: 74 MMHG | SYSTOLIC BLOOD PRESSURE: 109 MMHG

## 2022-11-16 DIAGNOSIS — Z98.890 OTHER SPECIFIED POSTPROCEDURAL STATES: ICD-10-CM

## 2022-11-16 DIAGNOSIS — N80.129 DEEP ENDOMETRIOSIS OF OVARY, UNSPECIFIED OVARY: ICD-10-CM

## 2022-11-16 PROBLEM — N80.9 ENDOMETRIOSIS, UNSPECIFIED: Chronic | Status: ACTIVE | Noted: 2022-10-10

## 2022-11-16 PROCEDURE — 76830 TRANSVAGINAL US NON-OB: CPT

## 2022-11-16 PROCEDURE — 99024 POSTOP FOLLOW-UP VISIT: CPT

## 2022-11-28 ENCOUNTER — NON-APPOINTMENT (OUTPATIENT)
Age: 22
End: 2022-11-28

## 2022-11-29 RX ORDER — NORETHINDRONE ACETATE 5 MG/1
5 TABLET ORAL
Qty: 180 | Refills: 1 | Status: ACTIVE | COMMUNITY
Start: 2022-11-29 | End: 1900-01-01

## 2022-12-04 RX ORDER — NORETHINDRONE ACETATE 5 MG/1
5 TABLET ORAL
Qty: 180 | Refills: 1 | Status: ACTIVE | COMMUNITY
Start: 2022-11-28 | End: 1900-01-01

## 2023-02-28 ENCOUNTER — APPOINTMENT (OUTPATIENT)
Dept: OBGYN | Facility: CLINIC | Age: 23
End: 2023-02-28

## 2023-03-08 ENCOUNTER — APPOINTMENT (OUTPATIENT)
Dept: OBGYN | Facility: CLINIC | Age: 23
End: 2023-03-08
Payer: MEDICAID

## 2023-03-08 VITALS
SYSTOLIC BLOOD PRESSURE: 117 MMHG | BODY MASS INDEX: 19.83 KG/M2 | WEIGHT: 119 LBS | HEIGHT: 65 IN | HEART RATE: 93 BPM | DIASTOLIC BLOOD PRESSURE: 79 MMHG

## 2023-03-08 PROCEDURE — 76830 TRANSVAGINAL US NON-OB: CPT

## 2023-03-08 PROCEDURE — 99213 OFFICE O/P EST LOW 20 MIN: CPT | Mod: 25

## 2023-03-08 NOTE — PROCEDURE
[Transvaginal Ultrasound] : transvaginal ultrasound [FreeTextEntry3] : cervix normal\par no free fluid [FreeTextEntry5] : 75 cc volume 12 mm [FreeTextEntry7] : 12 cc [FreeTextEntry8] : 8.1 cc

## 2023-05-12 ENCOUNTER — NON-APPOINTMENT (OUTPATIENT)
Age: 23
End: 2023-05-12

## 2023-05-16 ENCOUNTER — NON-APPOINTMENT (OUTPATIENT)
Age: 23
End: 2023-05-16

## 2023-06-07 ENCOUNTER — NON-APPOINTMENT (OUTPATIENT)
Age: 23
End: 2023-06-07

## 2023-06-12 ENCOUNTER — NON-APPOINTMENT (OUTPATIENT)
Age: 23
End: 2023-06-12

## 2023-06-14 ENCOUNTER — NON-APPOINTMENT (OUTPATIENT)
Age: 23
End: 2023-06-14

## 2023-06-16 ENCOUNTER — NON-APPOINTMENT (OUTPATIENT)
Age: 23
End: 2023-06-16

## 2023-07-11 ENCOUNTER — APPOINTMENT (OUTPATIENT)
Dept: OBGYN | Facility: CLINIC | Age: 23
End: 2023-07-11
Payer: MEDICAID

## 2023-07-11 VITALS
DIASTOLIC BLOOD PRESSURE: 81 MMHG | HEART RATE: 96 BPM | BODY MASS INDEX: 20.66 KG/M2 | WEIGHT: 124 LBS | SYSTOLIC BLOOD PRESSURE: 119 MMHG | HEIGHT: 65 IN

## 2023-07-11 LAB
HCG UR QL: NEGATIVE
QUALITY CONTROL: YES

## 2023-07-11 PROCEDURE — 58300 INSERT INTRAUTERINE DEVICE: CPT

## 2023-07-11 PROCEDURE — 81025 URINE PREGNANCY TEST: CPT

## 2023-07-11 NOTE — PROCEDURE
[IUD Placement] : intrauterine device (IUD) placement [Time out performed] : Pre-procedure time out performed.  Patient's name, date of birth and procedure confirmed. [Consent Obtained] : Consent obtained [Risks] : risks [Benefits] : benefits [Alternatives] : alternatives [Patient] : patient [Infection] : infection [Bleeding] : bleeding [Pain] : pain [Expulsion] : expulsion [Failure] : failure [Uterine Perforation] : uterine perforation [Neg Pregnancy Test] : negative pregnancy test [Betadine] : Betadine [Tenaculum] : Tenaculum [Mirena IUD] : Mirena IUD [Tolerated Well] : Patient tolerated the procedure well [No Complications] : No complications [de-identified] : [id number] [de-identified] : 2025/APR [de-identified] : [id number]

## 2023-07-12 ENCOUNTER — NON-APPOINTMENT (OUTPATIENT)
Age: 23
End: 2023-07-12

## 2023-07-25 ENCOUNTER — APPOINTMENT (OUTPATIENT)
Dept: OBGYN | Facility: CLINIC | Age: 23
End: 2023-07-25
Payer: MEDICAID

## 2023-07-25 VITALS
SYSTOLIC BLOOD PRESSURE: 109 MMHG | HEIGHT: 65 IN | HEART RATE: 82 BPM | BODY MASS INDEX: 20.66 KG/M2 | DIASTOLIC BLOOD PRESSURE: 74 MMHG | WEIGHT: 124 LBS

## 2023-07-25 DIAGNOSIS — N80.9 ENDOMETRIOSIS, UNSPECIFIED: ICD-10-CM

## 2023-07-25 DIAGNOSIS — Z78.9 OTHER SPECIFIED HEALTH STATUS: ICD-10-CM

## 2023-07-25 PROCEDURE — 99213 OFFICE O/P EST LOW 20 MIN: CPT | Mod: 25

## 2023-07-25 PROCEDURE — 76830 TRANSVAGINAL US NON-OB: CPT

## 2023-07-25 NOTE — PROCEDURE
Per 9/28 office note: Has remained stable since April 2021.  -Blood sugars have been lower to low normal.  Reduce glyburide to 1.25 mg and take only if blood sugars are greater than 120 or higher in a.m.  Advised to discuss further with Dr. Kelly at next follow-up in November.    Patient did see PCP in November.    Routed to PCP to advise on refill.    [Transvaginal Ultrasound] : transvaginal ultrasound [FreeTextEntry9] : ENDOMETRIOSIS [FreeTextEntry5] : 62.3 CC,  MIRENA IN PLACE [FreeTextEntry7] : 5.9 CC [FreeTextEntry8] : 3.9 CC
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
## 2023-08-14 ENCOUNTER — NON-APPOINTMENT (OUTPATIENT)
Age: 23
End: 2023-08-14

## 2024-08-26 NOTE — ED ADULT TRIAGE NOTE - ESI TRIAGE ACUITY LEVEL, MLM
Occupational Therapy    Evaluation    Patient Name: Janette Panda  MRN: [id number]  Today's Date: 8/26/2024  Time Calculation  Start Time: 0855  Stop Time: 0905  Time Calculation (min): 10 min        Assessment:  OT Assessment: no further acute OT needs, no OT needs post d/c  Prognosis: Excellent  Barriers to Discharge: None  Evaluation/Treatment Tolerance: Patient tolerated treatment well  Medical Staff Made Aware: Yes  End of Session Communication: Bedside nurse  End of Session Patient Position: Alarm off, not on at start of session  Prognosis: Excellent  Barriers to Discharge: None  Evaluation/Treatment Tolerance: Patient tolerated treatment well  Medical Staff Made Aware: Yes  Strengths: Attitude of self  Barriers to Participation: Comorbidities  Plan:  No Skilled OT: Independent with ADLs  OT Frequency: OT eval only  OT Discharge Recommendations: No further acute OT, No OT needed after discharge  Equipment Recommended upon Discharge: Crutches (shower chair)  OT Recommended Transfer Status: Independent  OT - OK to Discharge: Yes (upon medical clearance)       Subjective   Current Problem:  1. Acute osteomyelitis of right calcaneus (Multi)  Surgical Pathology Exam    Surgical Pathology Exam    AFB Culture/Smear    AFB Culture/Smear    Fungal Culture/Smear    Fungal Culture/Smear    Fungal Culture/Smear    Fungal Culture/Smear    Fungal Culture/Smear    Fungal Culture/Smear    Tissue/Wound Culture/Smear    Tissue/Wound Culture/Smear    Tissue/Wound Culture/Smear    Tissue/Wound Culture/Smear    Tissue/Wound Culture/Smear    Tissue/Wound Culture/Smear    calcium carbonate-vitamin D3 (Oscal-500) 500 mg-10 mcg (400 unit) tablet    docusate sodium (Colace) 100 mg capsule    aspirin 81 mg EC tablet    Crutches    Shower chair      2. Draining postoperative wound, subsequent encounter  Surgical Pathology Exam    Surgical Pathology Exam    AFB Culture/Smear    AFB Culture/Smear    Fungal Culture/Smear    Fungal  Culture/Smear    Fungal Culture/Smear    Fungal Culture/Smear    Fungal Culture/Smear    Fungal Culture/Smear    Tissue/Wound Culture/Smear    Tissue/Wound Culture/Smear    Tissue/Wound Culture/Smear    Tissue/Wound Culture/Smear    Tissue/Wound Culture/Smear    Tissue/Wound Culture/Smear      3. Postoperative pain  oxyCODONE (Roxicodone) 5 mg immediate release tablet        General:  General  Reason for Referral: 40 y.o. female s/p R calcanectomy, placement of antibiotic spacer, complex wound closure  on 8/23 with Dr. Lee.  Past Medical History Relevant to Rehab: Acute osteomyelitis of right calcaneus, Anemia, Anxiety, Arthritis, Asthma ([facility]-HCC), Metastatic lung cancer, and PTSD  Family/Caregiver Present: Yes  Caregiver Feedback: family member present and supportive  Prior to Session Communication: Bedside nurse  Patient Position Received: Alarm off, not on at start of session  Preferred Learning Style: auditory, verbal  General Comment: Pt supine in bed upon arrival, agreeable to OT  Precautions:  LE Weight Bearing Status: Right Non-Weight Bearing (in splint)  Medical Precautions: Fall precautions    Pain:  Pain Assessment  Pain Assessment: 0-10  0-10 (Numeric) Pain Score: 0 - No pain    Objective   Cognition:  Overall Cognitive Status: Within Functional Limits  Arousal/Alertness: Appropriate responses to stimuli  Orientation Level: Oriented X4  Insight: Within function limits  Impulsive: Within functional limits  Processing Speed: Within funtional limits     Home Living:  Type of Home: House  Lives With:  (mother and sister)  Home Adaptive Equipment: None  Home Layout: Two level  Home Access: Stairs to enter with rails  Entrance Stairs-Rails: Right  Entrance Stairs-Number of Steps: 2  Bathroom Shower/Tub: Tub/shower unit  Bathroom Equipment: None  Prior Function:  Level of [place]: Independent with ADLs and functional transfers, Independent with homemaking with ambulation  ADL Assistance:  Independent  Homemaking Assistance: Independent  Ambulatory Assistance: Independent  Vocational:  (- work)  Prior Function Comments: denies falls  IADL History:  Homemaking Responsibilities: Yes  Meal Prep Responsibility: Primary  Laundry Responsibility: Primary  Cleaning Responsibility: Primary  Bill Paying/Finance Responsibility: Primary  Current License: Yes  Mode of Transportation: Car  Occupation:  (- work)  ADL:  Eating Assistance: Independent (anticipated)  Grooming Assistance: Independent (Pt performed teethbrushing and handwashing standing at sink I)  Bathing Assistance: Stand by (anticipated seated)  UE Dressing Assistance: Independent (anticipated)  LE Dressing Assistance: Stand by (anticipated)  Toileting Assistance with Device: Independent (Pt performed toileting seated, sit <>stand I crutches, I cristy care)  Activity Tolerance:  Endurance: Endurance does not limit participation in activity  Bed Mobility/Transfers: Bed Mobility  Bed Mobility: Yes  Bed Mobility 1  Bed Mobility 1: Supine to sitting, Sitting to supine  Level of Assistance 1: Independent    Transfers  Transfer: Yes  Transfer 1  Transfer From 1: Sit to, Stand to  Transfer to 1: Stand, Sit  Technique 1: Stand to sit, Sit to stand  Transfer Device 1: Crutches  Transfer Level of Assistance 1: Modified independent  Transfers 2  Transfer From 2: Stand to, Toilet to  Transfer to 2: Toilet, Stand  Technique 2: Sit to stand, Stand to sit  Transfer Device 2: Crutches  Transfer Level of Assistance 2: Modified independent    Functional Mobility:  Functional Mobility  Functional Mobility Performed: Yes  Functional Mobility 1  Surface 1: Level tile  Device 1: Axillary crutches  Assistance 1: Distant supervision  Comments 1: fxnl mob min household distance bed <>bathroom distant supervision mod I crutches  Sitting Balance:  Static Sitting Balance  Static Sitting-Balance Support: Feet supported  Static Sitting-Level of Assistance: Independent  Dynamic  Sitting Balance  Dynamic Sitting-Balance Support: Feet supported  Dynamic Sitting-Level of Assistance: Independent  Standing Balance:  Static Standing Balance  Static Standing-Balance Support: Bilateral upper extremity supported  Static Standing-Level of Assistance: Modified Independent  Dynamic Standing Balance  Dynamic Standing-Balance Support: Bilateral upper extremity supported  Dynamic Standing-Level of Assistance: Modified independent   Modalities:  Modalities Used: No    Sensation:  Sensation Comment: reports burning RLE  Strength:  Strength Comments: BUE WFL  Perception:  Inattention/Neglect: Appears intact  Coordination:  Movements are Fluid and Coordinated: Yes   Hand Function:  Gross Grasp: Functional  Coordination: Functional  Extremities: RUE   RUE : Within Functional Limits and LUE   LUE: Within Functional Limits    Outcome Measures:[facility] Daily Activity  Putting on and taking off regular lower body clothing: A little  Bathing (including washing, rinsing, drying): A little  Putting on and taking off regular upper body clothing: None  Toileting, which includes using toilet, bedpan or urinal: None  Taking care of personal grooming such as brushing teeth: None  Eating Meals: None  Daily Activity - Total Score: 22     and OT Adult Other Outcome Measures  4AT: negative    Education Documentation  Body Mechanics, taught by Davide Martin OT at 8/26/2024 10:44 AM.  Learner: Patient  Readiness: Acceptance  Method: Explanation  Response: Verbalizes Understanding    Precautions, taught by Davide Martin OT at 8/26/2024 10:44 AM.  Learner: Patient  Readiness: Acceptance  Method: Explanation  Response: Verbalizes Understanding    ADL Training, taught by Davide Martin OT at 8/26/2024 10:44 AM.  Learner: Patient  Readiness: Acceptance  Method: Explanation  Response: Verbalizes Understanding    Education Comments  No comments found.      BRADLEY Main/L      5

## 2024-11-07 ENCOUNTER — APPOINTMENT (OUTPATIENT)
Dept: OBGYN | Facility: CLINIC | Age: 24
End: 2024-11-07
Payer: MEDICAID

## 2024-11-07 VITALS
SYSTOLIC BLOOD PRESSURE: 110 MMHG | HEIGHT: 65 IN | BODY MASS INDEX: 19.83 KG/M2 | DIASTOLIC BLOOD PRESSURE: 72 MMHG | WEIGHT: 119 LBS | HEART RATE: 80 BPM

## 2024-11-07 DIAGNOSIS — Z01.419 ENCOUNTER FOR GYNECOLOGICAL EXAMINATION (GENERAL) (ROUTINE) W/OUT ABNORMAL FINDINGS: ICD-10-CM

## 2024-11-07 DIAGNOSIS — N92.6 IRREGULAR MENSTRUATION, UNSPECIFIED: ICD-10-CM

## 2024-11-07 DIAGNOSIS — N80.9 ENDOMETRIOSIS, UNSPECIFIED: ICD-10-CM

## 2024-11-07 DIAGNOSIS — N80.129 DEEP ENDOMETRIOSIS OF OVARY, UNSPECIFIED OVARY: ICD-10-CM

## 2024-11-07 DIAGNOSIS — R39.89 OTHER SYMPTOMS AND SIGNS INVOLVING THE GENITOURINARY SYSTEM: ICD-10-CM

## 2024-11-07 DIAGNOSIS — N83.6 HEMATOSALPINX: ICD-10-CM

## 2024-11-07 LAB
BILIRUB UR QL STRIP: NORMAL
CLARITY UR: CLEAR
COLLECTION METHOD: NORMAL
GLUCOSE UR-MCNC: NORMAL
HCG UR QL: 0.2 EU/DL
HGB UR QL STRIP.AUTO: ABNORMAL
KETONES UR-MCNC: NORMAL
LEUKOCYTE ESTERASE UR QL STRIP: ABNORMAL
NITRITE UR QL STRIP: NORMAL
PH UR STRIP: 6
PROT UR STRIP-MCNC: ABNORMAL
SP GR UR STRIP: 1.02

## 2024-11-07 PROCEDURE — 76830 TRANSVAGINAL US NON-OB: CPT

## 2024-11-07 PROCEDURE — 99459 PELVIC EXAMINATION: CPT

## 2024-11-07 PROCEDURE — 99395 PREV VISIT EST AGE 18-39: CPT

## 2024-11-07 PROCEDURE — 81003 URINALYSIS AUTO W/O SCOPE: CPT | Mod: QW

## 2024-11-07 RX ORDER — NORETHINDRONE ACETATE 5 MG/1
5 TABLET ORAL
Qty: 90 | Refills: 3 | Status: ACTIVE | COMMUNITY
Start: 2024-11-07 | End: 1900-01-01

## 2024-11-11 LAB
BACTERIA UR CULT: NORMAL
C TRACH RRNA SPEC QL NAA+PROBE: NOT DETECTED
N GONORRHOEA RRNA SPEC QL NAA+PROBE: NOT DETECTED
SOURCE TP AMPLIFICATION: NORMAL

## 2024-11-15 LAB
CYTOLOGY CVX/VAG DOC THIN PREP: NORMAL
HPV HIGH+LOW RISK DNA PNL CVX: NOT DETECTED

## 2025-04-22 ENCOUNTER — NON-APPOINTMENT (OUTPATIENT)
Age: 25
End: 2025-04-22

## 2025-04-22 ENCOUNTER — APPOINTMENT (OUTPATIENT)
Dept: OBGYN | Facility: CLINIC | Age: 25
End: 2025-04-22
Payer: MEDICAID

## 2025-04-22 VITALS
HEIGHT: 65 IN | HEART RATE: 96 BPM | SYSTOLIC BLOOD PRESSURE: 108 MMHG | WEIGHT: 119 LBS | DIASTOLIC BLOOD PRESSURE: 74 MMHG | BODY MASS INDEX: 19.83 KG/M2

## 2025-04-22 DIAGNOSIS — N83.201 UNSPECIFIED OVARIAN CYST, RIGHT SIDE: ICD-10-CM

## 2025-04-22 DIAGNOSIS — N80.9 ENDOMETRIOSIS, UNSPECIFIED: ICD-10-CM

## 2025-04-22 DIAGNOSIS — N80.129 DEEP ENDOMETRIOSIS OF OVARY, UNSPECIFIED OVARY: ICD-10-CM

## 2025-04-22 DIAGNOSIS — R39.89 OTHER SYMPTOMS AND SIGNS INVOLVING THE GENITOURINARY SYSTEM: ICD-10-CM

## 2025-04-22 LAB
BILIRUB UR QL STRIP: NEGATIVE
CLARITY UR: NORMAL
COLLECTION METHOD: NORMAL
GLUCOSE UR-MCNC: NEGATIVE
HCG UR QL: 0.2 EU/DL
HGB UR QL STRIP.AUTO: ABNORMAL
KETONES UR-MCNC: ABNORMAL
LEUKOCYTE ESTERASE UR QL STRIP: ABNORMAL
NITRITE UR QL STRIP: NEGATIVE
PH UR STRIP: 6
PROT UR STRIP-MCNC: ABNORMAL
SP GR UR STRIP: 1.02

## 2025-04-22 PROCEDURE — 76830 TRANSVAGINAL US NON-OB: CPT

## 2025-04-22 PROCEDURE — 81003 URINALYSIS AUTO W/O SCOPE: CPT | Mod: QW

## 2025-04-22 PROCEDURE — 99205 OFFICE O/P NEW HI 60 MIN: CPT | Mod: 25

## 2025-04-22 PROCEDURE — 99215 OFFICE O/P EST HI 40 MIN: CPT | Mod: 25

## 2025-04-22 PROCEDURE — 99459 PELVIC EXAMINATION: CPT

## 2025-04-24 LAB — BACTERIA UR CULT: NORMAL

## 2025-06-05 ENCOUNTER — APPOINTMENT (OUTPATIENT)
Dept: OBGYN | Facility: CLINIC | Age: 25
End: 2025-06-05

## 2025-07-26 ENCOUNTER — NON-APPOINTMENT (OUTPATIENT)
Age: 25
End: 2025-07-26

## 2025-07-27 ENCOUNTER — EMERGENCY (EMERGENCY)
Facility: HOSPITAL | Age: 25
LOS: 0 days | Discharge: HOME | End: 2025-07-28
Attending: EMERGENCY MEDICINE
Payer: MEDICAID

## 2025-07-27 VITALS
TEMPERATURE: 98 F | DIASTOLIC BLOOD PRESSURE: 68 MMHG | SYSTOLIC BLOOD PRESSURE: 108 MMHG | OXYGEN SATURATION: 100 % | HEART RATE: 77 BPM | RESPIRATION RATE: 18 BRPM | WEIGHT: 119.93 LBS

## 2025-07-27 DIAGNOSIS — N80.9 ENDOMETRIOSIS, UNSPECIFIED: ICD-10-CM

## 2025-07-27 DIAGNOSIS — N39.0 URINARY TRACT INFECTION, SITE NOT SPECIFIED: ICD-10-CM

## 2025-07-27 DIAGNOSIS — Z87.42 PERSONAL HISTORY OF OTHER DISEASES OF THE FEMALE GENITAL TRACT: ICD-10-CM

## 2025-07-27 DIAGNOSIS — Z97.5 PRESENCE OF (INTRAUTERINE) CONTRACEPTIVE DEVICE: ICD-10-CM

## 2025-07-27 DIAGNOSIS — R10.814 LEFT LOWER QUADRANT ABDOMINAL TENDERNESS: ICD-10-CM

## 2025-07-27 DIAGNOSIS — Z98.890 OTHER SPECIFIED POSTPROCEDURAL STATES: Chronic | ICD-10-CM

## 2025-07-27 DIAGNOSIS — N83.201 UNSPECIFIED OVARIAN CYST, RIGHT SIDE: ICD-10-CM

## 2025-07-27 DIAGNOSIS — R10.2 PELVIC AND PERINEAL PAIN: ICD-10-CM

## 2025-07-27 DIAGNOSIS — R11.2 NAUSEA WITH VOMITING, UNSPECIFIED: ICD-10-CM

## 2025-07-27 DIAGNOSIS — N83.202 UNSPECIFIED OVARIAN CYST, LEFT SIDE: ICD-10-CM

## 2025-07-27 LAB
ALBUMIN SERPL ELPH-MCNC: 4.6 G/DL — SIGNIFICANT CHANGE UP (ref 3.5–5.2)
ALP SERPL-CCNC: 53 U/L — SIGNIFICANT CHANGE UP (ref 30–115)
ALT FLD-CCNC: 20 U/L — SIGNIFICANT CHANGE UP (ref 0–41)
ANION GAP SERPL CALC-SCNC: 13 MMOL/L — SIGNIFICANT CHANGE UP (ref 7–14)
APPEARANCE UR: ABNORMAL
AST SERPL-CCNC: 18 U/L — SIGNIFICANT CHANGE UP (ref 0–41)
BACTERIA # UR AUTO: ABNORMAL /HPF
BASOPHILS # BLD AUTO: 0.02 K/UL — SIGNIFICANT CHANGE UP (ref 0–0.2)
BASOPHILS NFR BLD AUTO: 0.2 % — SIGNIFICANT CHANGE UP (ref 0–2)
BILIRUB SERPL-MCNC: 0.3 MG/DL — SIGNIFICANT CHANGE UP (ref 0.2–1.2)
BILIRUB UR-MCNC: ABNORMAL
BUN SERPL-MCNC: 14 MG/DL — SIGNIFICANT CHANGE UP (ref 10–20)
CALCIUM SERPL-MCNC: 9.2 MG/DL — SIGNIFICANT CHANGE UP (ref 8.4–10.5)
CHLORIDE SERPL-SCNC: 102 MMOL/L — SIGNIFICANT CHANGE UP (ref 98–110)
CO2 SERPL-SCNC: 23 MMOL/L — SIGNIFICANT CHANGE UP (ref 17–32)
COLOR SPEC: ABNORMAL
CREAT SERPL-MCNC: 0.7 MG/DL — SIGNIFICANT CHANGE UP (ref 0.7–1.5)
DIFF PNL FLD: ABNORMAL
EGFR: 123 ML/MIN/1.73M2 — SIGNIFICANT CHANGE UP
EGFR: 123 ML/MIN/1.73M2 — SIGNIFICANT CHANGE UP
EOSINOPHIL # BLD AUTO: 0.04 K/UL — SIGNIFICANT CHANGE UP (ref 0–0.5)
EOSINOPHIL NFR BLD AUTO: 0.4 % — SIGNIFICANT CHANGE UP (ref 0–6)
EPI CELLS # UR: PRESENT
GLUCOSE SERPL-MCNC: 89 MG/DL — SIGNIFICANT CHANGE UP (ref 70–99)
GLUCOSE UR QL: NEGATIVE MG/DL — SIGNIFICANT CHANGE UP
HCT VFR BLD CALC: 37.3 % — SIGNIFICANT CHANGE UP (ref 34.5–45)
HGB BLD-MCNC: 12.3 G/DL — SIGNIFICANT CHANGE UP (ref 11.5–15.5)
IMM GRANULOCYTES # BLD AUTO: 0.02 K/UL — SIGNIFICANT CHANGE UP (ref 0–0.07)
IMM GRANULOCYTES NFR BLD AUTO: 0.2 % — SIGNIFICANT CHANGE UP (ref 0–0.9)
KETONES UR QL: NEGATIVE MG/DL — SIGNIFICANT CHANGE UP
LEUKOCYTE ESTERASE UR-ACNC: ABNORMAL
LIDOCAIN IGE QN: 32 U/L — SIGNIFICANT CHANGE UP (ref 7–60)
LYMPHOCYTES # BLD AUTO: 1.31 K/UL — SIGNIFICANT CHANGE UP (ref 1–3.3)
LYMPHOCYTES NFR BLD AUTO: 12.5 % — LOW (ref 13–44)
MCHC RBC-ENTMCNC: 28.8 PG — SIGNIFICANT CHANGE UP (ref 27–34)
MCHC RBC-ENTMCNC: 33 G/DL — SIGNIFICANT CHANGE UP (ref 32–36)
MCV RBC AUTO: 87.4 FL — SIGNIFICANT CHANGE UP (ref 80–100)
MONOCYTES # BLD AUTO: 0.53 K/UL — SIGNIFICANT CHANGE UP (ref 0–0.9)
MONOCYTES NFR BLD AUTO: 5.1 % — SIGNIFICANT CHANGE UP (ref 2–14)
NEUTROPHILS # BLD AUTO: 8.52 K/UL — HIGH (ref 1.8–7.4)
NEUTROPHILS NFR BLD AUTO: 81.6 % — HIGH (ref 43–77)
NITRITE UR-MCNC: POSITIVE
NRBC # BLD AUTO: 0 K/UL — SIGNIFICANT CHANGE UP (ref 0–0)
NRBC # FLD: 0 K/UL — SIGNIFICANT CHANGE UP (ref 0–0)
NRBC BLD AUTO-RTO: 0 /100 WBCS — SIGNIFICANT CHANGE UP (ref 0–0)
PH UR: 7.5 — SIGNIFICANT CHANGE UP (ref 5–8)
PLATELET # BLD AUTO: 171 K/UL — SIGNIFICANT CHANGE UP (ref 150–400)
PMV BLD: 10.3 FL — SIGNIFICANT CHANGE UP (ref 7–13)
POTASSIUM SERPL-MCNC: 3.8 MMOL/L — SIGNIFICANT CHANGE UP (ref 3.5–5)
POTASSIUM SERPL-SCNC: 3.8 MMOL/L — SIGNIFICANT CHANGE UP (ref 3.5–5)
PROT SERPL-MCNC: 7 G/DL — SIGNIFICANT CHANGE UP (ref 6–8)
PROT UR-MCNC: 30 MG/DL
RBC # BLD: 4.27 M/UL — SIGNIFICANT CHANGE UP (ref 3.8–5.2)
RBC # FLD: 12 % — SIGNIFICANT CHANGE UP (ref 10.3–14.5)
RBC CASTS # UR COMP ASSIST: 3 /HPF — SIGNIFICANT CHANGE UP (ref 0–4)
SODIUM SERPL-SCNC: 138 MMOL/L — SIGNIFICANT CHANGE UP (ref 135–146)
SP GR SPEC: 1.01 — SIGNIFICANT CHANGE UP (ref 1–1.03)
SQUAMOUS # UR AUTO: 2 /HPF — SIGNIFICANT CHANGE UP (ref 0–5)
UROBILINOGEN FLD QL: 1 MG/DL — SIGNIFICANT CHANGE UP (ref 0.2–1)
WBC # BLD: 10.44 K/UL — SIGNIFICANT CHANGE UP (ref 3.8–10.5)
WBC # FLD AUTO: 10.44 K/UL — SIGNIFICANT CHANGE UP (ref 3.8–10.5)
WBC UR QL: 8 /HPF — HIGH (ref 0–5)

## 2025-07-27 PROCEDURE — 36415 COLL VENOUS BLD VENIPUNCTURE: CPT

## 2025-07-27 PROCEDURE — 80053 COMPREHEN METABOLIC PANEL: CPT

## 2025-07-27 PROCEDURE — 96374 THER/PROPH/DIAG INJ IV PUSH: CPT

## 2025-07-27 PROCEDURE — 99284 EMERGENCY DEPT VISIT MOD MDM: CPT | Mod: 25

## 2025-07-27 PROCEDURE — 87086 URINE CULTURE/COLONY COUNT: CPT

## 2025-07-27 PROCEDURE — 76830 TRANSVAGINAL US NON-OB: CPT | Mod: 26

## 2025-07-27 PROCEDURE — 85025 COMPLETE CBC W/AUTO DIFF WBC: CPT

## 2025-07-27 PROCEDURE — 96375 TX/PRO/DX INJ NEW DRUG ADDON: CPT

## 2025-07-27 PROCEDURE — 81001 URINALYSIS AUTO W/SCOPE: CPT

## 2025-07-27 PROCEDURE — 74177 CT ABD & PELVIS W/CONTRAST: CPT

## 2025-07-27 PROCEDURE — 81025 URINE PREGNANCY TEST: CPT

## 2025-07-27 PROCEDURE — 74177 CT ABD & PELVIS W/CONTRAST: CPT | Mod: 26

## 2025-07-27 PROCEDURE — 83690 ASSAY OF LIPASE: CPT

## 2025-07-27 PROCEDURE — 99285 EMERGENCY DEPT VISIT HI MDM: CPT

## 2025-07-27 PROCEDURE — 76830 TRANSVAGINAL US NON-OB: CPT

## 2025-07-27 RX ORDER — KETOROLAC TROMETHAMINE 30 MG/ML
15 INJECTION, SOLUTION INTRAMUSCULAR; INTRAVENOUS ONCE
Refills: 0 | Status: DISCONTINUED | OUTPATIENT
Start: 2025-07-27 | End: 2025-07-27

## 2025-07-27 RX ORDER — ONDANSETRON HCL/PF 4 MG/2 ML
4 VIAL (ML) INJECTION ONCE
Refills: 0 | Status: COMPLETED | OUTPATIENT
Start: 2025-07-27 | End: 2025-07-27

## 2025-07-27 RX ADMIN — Medication 4 MILLIGRAM(S): at 22:58

## 2025-07-27 RX ADMIN — Medication 1000 MILLILITER(S): at 22:59

## 2025-07-27 RX ADMIN — KETOROLAC TROMETHAMINE 15 MILLIGRAM(S): 30 INJECTION, SOLUTION INTRAMUSCULAR; INTRAVENOUS at 22:58

## 2025-07-27 NOTE — ED ADULT TRIAGE NOTE - CHIEF COMPLAINT QUOTE
on abx for uti since yesterday from urgent care, worsening symptoms of pelvic pain, vomiting and back pain

## 2025-07-27 NOTE — ED PROVIDER NOTE - CLINICAL SUMMARY MEDICAL DECISION MAKING FREE TEXT BOX
25-year-old female, history of endometriosis, ovarian cyst, IUD, presents with pelvic pain, vomiting, dysuria and hematuria.  Was seen in urgent care and given Macrobid and Pyridium.  No fever.  Exam shows alert patient in no distress, HEENT NCAT PERRL, neck supple, throat no exudates, lungs clear, RR S1S2, abdomen soft with pelvic tenderness +BS, no CCE.  Labs unremarkable.  Urinalysis positive nitrite, WBC 8, bacteria few.  Endovaginal sonogram shows bilateral ovarian cysts, no torsion.  CT abdomen urinary bladder thickening consistent with cystitis.  Will DC on Ceftin follow-up with PCP.

## 2025-07-27 NOTE — ED PROVIDER NOTE - OBJECTIVE STATEMENT
25 years old female history of endometriosis presenting complaints of lower abdominal/pelvic pain that started tonight with nausea and vomiting x 1.  Per patient, she had dysuria yesterday so she went to outside urgent care where she is being treated with Macrobid and Pyridium.  Feeling pain tonight with nausea and vomiting x 1 and chills so she comes to ED for evaluation.  Otherwise denies headache, dizziness, chest pain, shortness of breath, flank pain, and diarrhea. Reports she is not having regular menses secondary to IUD.

## 2025-07-27 NOTE — ED PROVIDER NOTE - PHYSICAL EXAMINATION
CONSTITUTIONAL: Well-appearing;  in no apparent distress.   EYES: PERRL; EOM intact.   CARDIOVASCULAR: Normal S1, S2; no murmurs, rubs, or gallops.   RESPIRATORY: Normal chest excursion with respiration; breath sounds clear and equal bilaterally; no wheezes, rhonchi, or rales.  GI/:  + Mild suprapubic/LLQ tenderness.  Normal bowel sounds; flat and soft abdomen.  Non-distended;  no palpable organomegaly.  No CVA tenderness.  SKIN: No acute rash  NEURO/PSYCH: A & O x 4; grossly unremarkable.

## 2025-07-27 NOTE — ED PROVIDER NOTE - PATIENT PORTAL LINK FT
You can access the FollowMyHealth Patient Portal offered by [facility] by registering at the following website: [email or web address]. By joining [facility]’s FollowMyHealth portal, you will also be able to view your health information using other applications (apps) compatible with our system.

## 2025-07-27 NOTE — ED ADULT NURSE NOTE - NSFALLUNIVINTERV_ED_ALL_ED
Bed/Stretcher in lowest position, wheels locked, appropriate side rails in place/Call bell, personal items and telephone in reach/Instruct patient to call for assistance before getting out of bed/chair/stretcher/Non-slip footwear applied when patient is off stretcher/[place] to call system/Physically safe environment - no spills, clutter or unnecessary equipment/Purposeful proactive rounding/Room/bathroom lighting operational, light cord in reach

## 2025-07-27 NOTE — ED PROVIDER NOTE - NSFOLLOWUPINSTRUCTIONS_ED_ALL_ED_FT
Urinary Tract Infection    A urinary tract infection (UTI) is an infection of any part of the urinary tract, which includes the kidneys, ureters, bladder, and urethra. Risk factors include ignoring your need to urinate, wiping back to front if female, being an uncircumcised male, and having diabetes or a weak immune system. Symptoms include frequent urination, pain or burning with urination, foul smelling urine, cloudy urine, pain in the lower abdomen, blood in the urine, and fever. If you were prescribed an antibiotic medicine, take it as told by your health care provider. Do not stop taking the antibiotic even if you start to feel better.    SEEK IMMEDIATE MEDICAL CARE IF YOU HAVE THE FOLLOWING SYMPTOMS: severe back or abdominal pain, inability to keep fluids or medicine down, dizziness/lightheadedness, or a change in mental status.     Ovarian Cyst    An ovarian cyst is a fluid-filled sac that forms on an ovary. The ovaries are small organs that produce eggs in women. Various types of cysts can form on the ovaries. Most are not cancerous. Many do not cause problems, and they often go away on their own. Some may cause symptoms and require treatment. Common types of ovarian cysts include:     Functional cysts—These cysts may occur every month during the menstrual cycle. This is normal. The cysts usually go away with the next menstrual cycle if the woman does not get pregnant. Usually, there are no symptoms with a functional cyst.  Endometrioma cysts—These cysts form from the tissue that lines the uterus. They are also called "chocolate cysts" because they become filled with blood that turns brown. This type of cyst can cause pain in the lower abdomen during intercourse and with your menstrual period.  Cystadenoma cysts—This type develops from the cells on the outside of the ovary. These cysts can get very big and cause lower abdomen pain and pain with intercourse. This type of cyst can twist on itself, cut off its blood supply, and cause severe pain. It can also easily rupture and cause a lot of pain.  Dermoid cysts—This type of cyst is sometimes found in both ovaries. These cysts may contain different kinds of body tissue, such as skin, teeth, hair, or cartilage. They usually do not cause symptoms unless they get very big.   Theca lutein cysts—These cysts occur when too much of a certain hormone (human chorionic gonadotropin) is produced and overstimulates the ovaries to produce an egg. This is most common after procedures used to assist with the conception of a baby (in vitro fertilization).    CAUSES  Fertility drugs can cause a condition in which multiple large cysts are formed on the ovaries. This is called ovarian hyperstimulation syndrome.   A condition called polycystic ovary syndrome can cause hormonal imbalances that can lead to nonfunctional ovarian cysts.     SIGNS AND SYMPTOMS  Many ovarian cysts do not cause symptoms. If symptoms are present, they may include:    Pelvic pain or pressure.  Pain in the lower abdomen.  Pain during sexual intercourse.  Increasing girth (swelling) of the abdomen.  Abnormal menstrual periods.  Increasing pain with menstrual periods.  Stopping having menstrual periods without being pregnant.    DIAGNOSIS  These cysts are commonly found during a routine or annual pelvic exam. Tests may be ordered to find out more about the cyst. These tests may include:    Ultrasound.  X-ray of the pelvis.  CT scan.  MRI.  Blood tests.    TREATMENT  Many ovarian cysts go away on their own without treatment. Your health care provider may want to check your cyst regularly for 2–3 months to see if it changes. For women in menopause, it is particularly important to monitor a cyst closely because of the higher rate of ovarian cancer in menopausal women. When treatment is needed, it may include any of the following:    A procedure to drain the cyst (aspiration). This may be done using a long needle and ultrasound. It can also be done through a laparoscopic procedure. This involves using a thin, lighted tube with a tiny camera on the end (laparoscope) inserted through a small incision.   Surgery to remove the whole cyst. This may be done using laparoscopic surgery or an open surgery involving a larger incision in the lower abdomen.   Hormone treatment or birth control pills. These methods are sometimes used to help dissolve a cyst.    HOME CARE INSTRUCTIONS  Only take over-the-counter or prescription medicines as directed by your health care provider.   Follow up with your health care provider as directed.   Get regular pelvic exams and Pap tests.    SEEK MEDICAL CARE IF:  Your periods are late, irregular, or painful, or they stop.  Your pelvic pain or abdominal pain does not go away.  Your abdomen becomes larger or swollen.  You have pressure on your bladder or trouble emptying your bladder completely.  You have pain during sexual intercourse.  You have feelings of fullness, pressure, or discomfort in your stomach.  You lose weight for no apparent reason.  You feel generally ill.  You become constipated.  You lose your appetite.  You develop acne.  You have an increase in body and facial hair.  You are gaining weight, without changing your exercise and eating habits.  You think you are pregnant.    SEEK IMMEDIATE MEDICAL CARE IF:  You have increasing abdominal pain.  You feel sick to your stomach (nauseous), and you throw up (vomit).  You develop a fever that comes on suddenly.  You have abdominal pain during a bowel movement.  Your menstrual periods become heavier than usual.

## 2025-07-28 RX ORDER — CEFUROXIME SODIUM 1.5 G
1 VIAL (EA) INJECTION
Qty: 14 | Refills: 0
Start: 2025-07-28 | End: 2025-08-03

## 2025-07-28 RX ORDER — CEFUROXIME SODIUM 1.5 G
500 VIAL (EA) INJECTION ONCE
Refills: 0 | Status: COMPLETED | OUTPATIENT
Start: 2025-07-28 | End: 2025-07-28

## 2025-07-28 RX ADMIN — Medication 500 MILLIGRAM(S): at 01:02

## 2025-07-29 LAB
CULTURE RESULTS: SIGNIFICANT CHANGE UP
SPECIMEN SOURCE: SIGNIFICANT CHANGE UP